# Patient Record
Sex: FEMALE | Race: BLACK OR AFRICAN AMERICAN | Employment: UNEMPLOYED | URBAN - METROPOLITAN AREA
[De-identification: names, ages, dates, MRNs, and addresses within clinical notes are randomized per-mention and may not be internally consistent; named-entity substitution may affect disease eponyms.]

---

## 2017-12-18 ENCOUNTER — APPOINTMENT (OUTPATIENT)
Dept: GENERAL RADIOLOGY | Age: 37
DRG: 862 | End: 2017-12-18
Attending: EMERGENCY MEDICINE
Payer: OTHER GOVERNMENT

## 2017-12-18 ENCOUNTER — APPOINTMENT (OUTPATIENT)
Dept: CT IMAGING | Age: 37
DRG: 862 | End: 2017-12-18
Attending: EMERGENCY MEDICINE
Payer: OTHER GOVERNMENT

## 2017-12-18 ENCOUNTER — HOSPITAL ENCOUNTER (INPATIENT)
Age: 37
LOS: 2 days | Discharge: HOME | DRG: 862 | End: 2017-12-20
Attending: EMERGENCY MEDICINE | Admitting: INTERNAL MEDICINE
Payer: OTHER GOVERNMENT

## 2017-12-18 DIAGNOSIS — I26.99 OTHER ACUTE PULMONARY EMBOLISM WITHOUT ACUTE COR PULMONALE (HCC): Primary | ICD-10-CM

## 2017-12-18 PROBLEM — R65.10 SIRS (SYSTEMIC INFLAMMATORY RESPONSE SYNDROME) (HCC): Status: ACTIVE | Noted: 2017-12-18

## 2017-12-18 PROBLEM — L03.90 CELLULITIS: Status: ACTIVE | Noted: 2017-12-18

## 2017-12-18 PROBLEM — E66.9 OBESITY: Status: ACTIVE | Noted: 2017-12-18

## 2017-12-18 LAB
ALBUMIN SERPL-MCNC: 3 G/DL (ref 3.5–5)
ALBUMIN/GLOB SERPL: 0.6 {RATIO} (ref 1.1–2.2)
ALP SERPL-CCNC: 70 U/L (ref 45–117)
ALT SERPL-CCNC: 63 U/L (ref 12–78)
ANION GAP BLD CALC-SCNC: 18 MMOL/L (ref 5–15)
ANION GAP SERPL CALC-SCNC: 11 MMOL/L (ref 5–15)
APPEARANCE UR: CLEAR
AST SERPL-CCNC: 33 U/L (ref 15–37)
BACTERIA URNS QL MICRO: ABNORMAL /HPF
BASOPHILS # BLD: 0 K/UL (ref 0–0.1)
BASOPHILS NFR BLD: 0 % (ref 0–1)
BILIRUB SERPL-MCNC: 0.6 MG/DL (ref 0.2–1)
BILIRUB UR QL: NEGATIVE
BUN BLD-MCNC: 8 MG/DL (ref 9–20)
BUN SERPL-MCNC: 8 MG/DL (ref 6–20)
BUN/CREAT SERPL: 8 (ref 12–20)
CA-I BLD-MCNC: 1.11 MMOL/L (ref 1.12–1.32)
CALCIUM SERPL-MCNC: 8.6 MG/DL (ref 8.5–10.1)
CHLORIDE BLD-SCNC: 101 MMOL/L (ref 98–107)
CHLORIDE SERPL-SCNC: 101 MMOL/L (ref 97–108)
CO2 BLD-SCNC: 24 MMOL/L (ref 21–32)
CO2 SERPL-SCNC: 26 MMOL/L (ref 21–32)
COLOR UR: ABNORMAL
CREAT BLD-MCNC: 1 MG/DL (ref 0.6–1.3)
CREAT SERPL-MCNC: 1.02 MG/DL (ref 0.55–1.02)
EOSINOPHIL # BLD: 0.2 K/UL (ref 0–0.4)
EOSINOPHIL NFR BLD: 2 % (ref 0–7)
EPITH CASTS URNS QL MICRO: ABNORMAL /LPF
ERYTHROCYTE [DISTWIDTH] IN BLOOD BY AUTOMATED COUNT: 12.6 % (ref 11.5–14.5)
GLOBULIN SER CALC-MCNC: 4.7 G/DL (ref 2–4)
GLUCOSE BLD-MCNC: 102 MG/DL (ref 65–100)
GLUCOSE SERPL-MCNC: 100 MG/DL (ref 65–100)
GLUCOSE UR STRIP.AUTO-MCNC: NEGATIVE MG/DL
HCT VFR BLD AUTO: 38.8 % (ref 35–47)
HCT VFR BLD CALC: 38 % (ref 35–47)
HGB BLD-MCNC: 12.9 GM/DL (ref 11.5–16)
HGB BLD-MCNC: 13.2 G/DL (ref 11.5–16)
HGB UR QL STRIP: NEGATIVE
KETONES UR QL STRIP.AUTO: NEGATIVE MG/DL
LACTATE SERPL-SCNC: 1.3 MMOL/L (ref 0.4–2)
LEUKOCYTE ESTERASE UR QL STRIP.AUTO: NEGATIVE
LYMPHOCYTES # BLD: 2.4 K/UL (ref 0.8–3.5)
LYMPHOCYTES NFR BLD: 17 % (ref 12–49)
MCH RBC QN AUTO: 29.7 PG (ref 26–34)
MCHC RBC AUTO-ENTMCNC: 34 G/DL (ref 30–36.5)
MCV RBC AUTO: 87.2 FL (ref 80–99)
MONOCYTES # BLD: 1.2 K/UL (ref 0–1)
MONOCYTES NFR BLD: 8 % (ref 5–13)
NEUTS SEG # BLD: 10.2 K/UL (ref 1.8–8)
NEUTS SEG NFR BLD: 73 % (ref 32–75)
NITRITE UR QL STRIP.AUTO: NEGATIVE
PH UR STRIP: 7 [PH] (ref 5–8)
PLATELET # BLD AUTO: 227 K/UL (ref 150–400)
POTASSIUM BLD-SCNC: 3.9 MMOL/L (ref 3.5–5.1)
POTASSIUM SERPL-SCNC: 3.9 MMOL/L (ref 3.5–5.1)
PROT SERPL-MCNC: 7.7 G/DL (ref 6.4–8.2)
PROT UR STRIP-MCNC: NEGATIVE MG/DL
RBC # BLD AUTO: 4.45 M/UL (ref 3.8–5.2)
RBC #/AREA URNS HPF: ABNORMAL /HPF (ref 0–5)
SERVICE CMNT-IMP: ABNORMAL
SODIUM BLD-SCNC: 138 MMOL/L (ref 136–145)
SODIUM SERPL-SCNC: 138 MMOL/L (ref 136–145)
SP GR UR REFRACTOMETRY: <1.005 (ref 1–1.03)
UA: UC IF INDICATED,UAUC: ABNORMAL
UROBILINOGEN UR QL STRIP.AUTO: 1 EU/DL (ref 0.2–1)
WBC # BLD AUTO: 14 K/UL (ref 3.6–11)
WBC URNS QL MICRO: ABNORMAL /HPF (ref 0–4)

## 2017-12-18 PROCEDURE — 80053 COMPREHEN METABOLIC PANEL: CPT | Performed by: EMERGENCY MEDICINE

## 2017-12-18 PROCEDURE — 71010 XR CHEST PORT: CPT

## 2017-12-18 PROCEDURE — 81001 URINALYSIS AUTO W/SCOPE: CPT | Performed by: EMERGENCY MEDICINE

## 2017-12-18 PROCEDURE — 83605 ASSAY OF LACTIC ACID: CPT | Performed by: EMERGENCY MEDICINE

## 2017-12-18 PROCEDURE — 36415 COLL VENOUS BLD VENIPUNCTURE: CPT | Performed by: EMERGENCY MEDICINE

## 2017-12-18 PROCEDURE — 74011250636 HC RX REV CODE- 250/636: Performed by: INTERNAL MEDICINE

## 2017-12-18 PROCEDURE — 74011250636 HC RX REV CODE- 250/636: Performed by: EMERGENCY MEDICINE

## 2017-12-18 PROCEDURE — 85025 COMPLETE CBC W/AUTO DIFF WBC: CPT | Performed by: EMERGENCY MEDICINE

## 2017-12-18 PROCEDURE — 65660000000 HC RM CCU STEPDOWN

## 2017-12-18 PROCEDURE — 96374 THER/PROPH/DIAG INJ IV PUSH: CPT

## 2017-12-18 PROCEDURE — 80047 BASIC METABLC PNL IONIZED CA: CPT

## 2017-12-18 PROCEDURE — 93005 ELECTROCARDIOGRAM TRACING: CPT

## 2017-12-18 PROCEDURE — 71275 CT ANGIOGRAPHY CHEST: CPT

## 2017-12-18 PROCEDURE — 99285 EMERGENCY DEPT VISIT HI MDM: CPT

## 2017-12-18 PROCEDURE — 87040 BLOOD CULTURE FOR BACTERIA: CPT | Performed by: EMERGENCY MEDICINE

## 2017-12-18 PROCEDURE — 87086 URINE CULTURE/COLONY COUNT: CPT | Performed by: EMERGENCY MEDICINE

## 2017-12-18 PROCEDURE — 96361 HYDRATE IV INFUSION ADD-ON: CPT

## 2017-12-18 PROCEDURE — 86738 MYCOPLASMA ANTIBODY: CPT | Performed by: INTERNAL MEDICINE

## 2017-12-18 RX ORDER — SODIUM CHLORIDE 0.9 % (FLUSH) 0.9 %
5-10 SYRINGE (ML) INJECTION AS NEEDED
Status: DISCONTINUED | OUTPATIENT
Start: 2017-12-18 | End: 2017-12-20 | Stop reason: HOSPADM

## 2017-12-18 RX ORDER — KETOROLAC TROMETHAMINE 30 MG/ML
30 INJECTION, SOLUTION INTRAMUSCULAR; INTRAVENOUS
Status: COMPLETED | OUTPATIENT
Start: 2017-12-18 | End: 2017-12-18

## 2017-12-18 RX ORDER — ENOXAPARIN SODIUM 100 MG/ML
1 INJECTION SUBCUTANEOUS EVERY 12 HOURS
Status: DISCONTINUED | OUTPATIENT
Start: 2017-12-19 | End: 2017-12-18

## 2017-12-18 RX ORDER — VANCOMYCIN/0.9 % SOD CHLORIDE 1.5G/250ML
1500 PLASTIC BAG, INJECTION (ML) INTRAVENOUS EVERY 12 HOURS
Status: DISCONTINUED | OUTPATIENT
Start: 2017-12-19 | End: 2017-12-20

## 2017-12-18 RX ORDER — ENOXAPARIN SODIUM 100 MG/ML
40 INJECTION SUBCUTANEOUS EVERY 24 HOURS
Status: DISCONTINUED | OUTPATIENT
Start: 2017-12-19 | End: 2017-12-18

## 2017-12-18 RX ORDER — ENOXAPARIN SODIUM 100 MG/ML
1 INJECTION SUBCUTANEOUS
Status: COMPLETED | OUTPATIENT
Start: 2017-12-18 | End: 2017-12-18

## 2017-12-18 RX ORDER — SODIUM CHLORIDE 0.9 % (FLUSH) 0.9 %
5-10 SYRINGE (ML) INJECTION EVERY 8 HOURS
Status: DISCONTINUED | OUTPATIENT
Start: 2017-12-18 | End: 2017-12-20 | Stop reason: HOSPADM

## 2017-12-18 RX ORDER — SODIUM CHLORIDE 9 MG/ML
30 INJECTION, SOLUTION INTRAVENOUS ONCE
Status: COMPLETED | OUTPATIENT
Start: 2017-12-18 | End: 2017-12-19

## 2017-12-18 RX ORDER — OXYCODONE AND ACETAMINOPHEN 5; 325 MG/1; MG/1
1 TABLET ORAL
Status: DISCONTINUED | OUTPATIENT
Start: 2017-12-18 | End: 2017-12-20

## 2017-12-18 RX ORDER — ACETAMINOPHEN 325 MG/1
650 TABLET ORAL
Status: DISCONTINUED | OUTPATIENT
Start: 2017-12-18 | End: 2017-12-20 | Stop reason: HOSPADM

## 2017-12-18 RX ORDER — LEVOFLOXACIN 5 MG/ML
750 INJECTION, SOLUTION INTRAVENOUS EVERY 24 HOURS
Status: DISCONTINUED | OUTPATIENT
Start: 2017-12-19 | End: 2017-12-20

## 2017-12-18 RX ORDER — ENOXAPARIN SODIUM 100 MG/ML
1 INJECTION SUBCUTANEOUS EVERY 12 HOURS
Status: DISCONTINUED | OUTPATIENT
Start: 2017-12-19 | End: 2017-12-20

## 2017-12-18 RX ORDER — OXYCODONE AND ACETAMINOPHEN 5; 325 MG/1; MG/1
1 TABLET ORAL
COMMUNITY
End: 2017-12-20

## 2017-12-18 RX ADMIN — SODIUM CHLORIDE 2913 ML: 900 INJECTION, SOLUTION INTRAVENOUS at 21:00

## 2017-12-18 RX ADMIN — VANCOMYCIN HYDROCHLORIDE 2500 MG: 10 INJECTION, POWDER, LYOPHILIZED, FOR SOLUTION INTRAVENOUS at 23:03

## 2017-12-18 RX ADMIN — KETOROLAC TROMETHAMINE 30 MG: 30 INJECTION, SOLUTION INTRAMUSCULAR at 21:33

## 2017-12-18 RX ADMIN — ENOXAPARIN SODIUM 100 MG: 100 INJECTION SUBCUTANEOUS at 22:14

## 2017-12-18 NOTE — IP AVS SNAPSHOT
Summary of Care Report The Summary of Care report has been created to help improve care coordination. Users with access to [employer] or [address] (Web-based application) may access additional patient information including the Discharge Summary. If you are not currently a [address] user and need more information, please call the number listed below in the [place] [address] section and ask to be connected with Medical Records. Facility Information Name Address Phone [address] [place] [address] [phone] Patient Information Patient Name Sex  Gilma Schmitz ([id number]) Female 1980 Discharge Information Admitting Provider Service Area Unit Rosy Salazar MD / Debbie [facility] [address] [facility]  [phone] Discharge Provider Discharge Date/Time Discharge Disposition Destination , DO / [phone] 17 1330 A[facility] (none) Patient Language Language ENGLISH [13] Hospital Problems as of 2017  Reviewed: 2017 10:51 PM by Rosy Salazar MD  
  
  
  
 Class Noted - Resolved Last Modified POA Active Problems * (Principal)Pulmonary emboli ([facility]  2017 - Present 2017 by Rosy Salazar MD Unknown Entered by Rosy Salazar MD  
  SIRS (systemic inflammatory response syndrome) ([facility]  2017 - Present 2017 by Rosy Salazar MD Unknown Entered by Rosy Salazar MD  
  Cellulitis  2017 - Present 2017 by Rosy Salazar MD Unknown Entered by Rosy Salazar MD  
  Obesity  2017 - Present 2017 by Rosy Salazar MD Unknown Entered by Rosy Salazar MD  
  
Non-Hospital Problems as of 2017  Reviewed: 2017 10:51 PM by Rosy Salazar MD  
 None You are allergic to the following No active allergies Current Discharge Medication List  
  
START taking these medications Dose & Instructions Dispensing Information Comments  
 apixaban 5 mg tablet Commonly known as:  Ladarius Ramirez Take 2 tabs PO BID x 7 days then 1 tab PO BID thereafter Quantity:  74 Tab Refills:  0  
   
 docusate sodium 100 mg capsule Commonly known as:  Melvenia Clipper Dose:  100 mg Take 1 Cap by mouth daily for 90 days. Quantity:  30 Cap Refills:  2  
   
 doxycycline 100 mg tablet Commonly known as:  VIBRA-TABS Dose:  100 mg Take 1 Tab by mouth every twelve (12) hours. Quantity:  10 Tab Refills:  0  
   
 levoFLOXacin 750 mg tablet Commonly known as:  Kemar Specking Dose:  750 mg Take 1 Tab by mouth every twenty-four (24) hours. Quantity:  5 Tab Refills:  0  
   
 neomycin-bacitracnZn-polymyxnB 3.5-400-5,000 mg-unit-unit Oipk ointment Commonly known as:  NEOSPORIN Dose:  1 Packet Apply 1 Packet to affected area daily. Quantity:  30 Packet Refills:  0  
   
 oxyCODONE-acetaminophen 7.5-325 mg per tablet Commonly known as:  PERCOCET 7.5 Replaces:  PERCOCET 5-325 mg per tablet Dose:  1 Tab Take 1 Tab by mouth every six (6) hours as needed. Max Daily Amount: 4 Tabs. Quantity:  12 Tab Refills:  0  
   
 promethazine 25 mg tablet Commonly known as:  PHENERGAN Dose:  25 mg Take 1 Tab by mouth every six (6) hours as needed for Nausea. Quantity:  20 Tab Refills:  0 STOP taking these medications Comments PERCOCET 5-325 mg per tablet Generic drug:  oxyCODONE-acetaminophen Replaced by:  oxyCODONE-acetaminophen 7.5-325 mg per tablet Follow-up Information Follow up With Details Comments Contact Info Berlin Pa, MD   Patient can only remember the practice name and not the physician [facility] [address] [facility] [address] [phone] Discharge Instructions None Chart Review Routing History No Routing History on File

## 2017-12-18 NOTE — IP AVS SNAPSHOT
Brianne Ann 
 
 
 [facility] [address] [facility] 
[phone] Patient: César Mays MRN: [id number] HYF:9/0/0585 About your hospitalization You were admitted on:  December 18, 2017 You last received care in the:  [facility] 3 PROG CARE TELE 2 You were discharged on:  December 20, 2017 Why you were hospitalized Your primary diagnosis was:  Pulmonary Emboli (H[facility]) Your diagnoses also included:  Sirs (Systemic Inflammatory Response Syndrome) ([facility]), Cellulitis, Obesity Things You Need To Do (next 8 weeks) Follow up with Berlin Pa MD  
  
Where:  Patient can only remember the practice name and not the physician Follow up with [facility] Phone:  [phone] Where:  Jamil Salas [facility] [place] [address] [facility] [address] Discharge Orders None A check david indicates which time of day the medication should be taken. My Medications STOP taking these medications PERCOCET 5-325 mg per tablet Generic drug:  oxyCODONE-acetaminophen Replaced by:  oxyCODONE-acetaminophen 7.5-325 mg per tablet TAKE these medications as instructed Instructions Each Dose to Equal  
 Morning Noon Evening Bedtime  
 apixaban 5 mg tablet Commonly known as:  Joel Limon Your last dose was: Your next dose is: Take 2 tabs PO BID x 7 days then 1 tab PO BID thereafter  
     
   
   
   
  
 docusate sodium 100 mg capsule Commonly known as:  Zamzam Donate Your last dose was: Your next dose is: Take 1 Cap by mouth daily for 90 days. 100 mg  
    
   
   
   
  
 doxycycline 100 mg tablet Commonly known as:  VIBRA-TABS Your last dose was: Your next dose is: Take 1 Tab by mouth every twelve (12) hours. 100 mg  
    
   
   
   
  
 levoFLOXacin 750 mg tablet Commonly known as:  Marcell Meek Your last dose was: Your next dose is: Take 1 Tab by mouth every twenty-four (24) hours. 750 mg  
    
   
   
   
  
 neomycin-bacitracnZn-polymyxnB 3.5-400-5,000 mg-unit-unit Oipk ointment Commonly known as:  NEOSPORIN Your last dose was: Your next dose is:    
   
   
 Apply 1 Packet to affected area daily. 1 Packet  
    
   
   
   
  
 oxyCODONE-acetaminophen 7.5-325 mg per tablet Commonly known as:  PERCOCET 7.5 Your last dose was: Your next dose is: Take 1 Tab by mouth every six (6) hours as needed. Max Daily Amount: 4 Tabs. 1 Tab  
    
   
   
   
  
 promethazine 25 mg tablet Commonly known as:  PHENERGAN Your last dose was: Your next dose is: Take 1 Tab by mouth every six (6) hours as needed for Nausea. 25 mg Where to Get Your Medications Information on where to get these meds will be given to you by the nurse or doctor. ! Ask your nurse or doctor about these medications  
  apixaban 5 mg tablet  
 docusate sodium 100 mg capsule  
 doxycycline 100 mg tablet  
 levoFLOXacin 750 mg tablet  
 neomycin-bacitracnZn-polymyxnB 3.5-400-5,000 mg-unit-unit Oipk ointment  
 oxyCODONE-acetaminophen 7.5-325 mg per tablet  
 promethazine 25 mg tablet Discharge Instructions None [facility] Announcement We are excited to announce that we are making your provider's discharge notes available to you in [employer]. You will see these notes when they are completed and signed by the physician that discharged you from your recent hospital stay. If you have any questions or concerns about any information you see in [employer], please call the Health Information Department where you were seen or reach out to your Primary Care Provider for more information about your plan of care. Introducing [facility]!    
 Nolan Gomez introduces [employer] patient portal. Now you can access parts of your medical record, email your doctor's office, and request medication refills online. 1. In your internet browser, go to [email or web address] 2. Click on the First Time User? Click Here link in the Sign In box. You will see the New Member Sign Up page. 3. Enter your DocsInk Access Code exactly as it appears below. You will not need to use this code after youve completed the sign-up process. If you do not sign up before the expiration date, you must request a new code. · DocsInk Access Code: [email or web address] Expires: 3/20/2018 10:13 AM 
 
4. Enter the last four digits of your Social Security Number (xxxx) and Date of Birth (mm/dd/yyyy) as indicated and click Submit. You will be taken to the next sign-up page. 5. Create a DocsInk ID. This will be your DocsInk login ID and cannot be changed, so think of one that is secure and easy to remember. 6. Create a [employer]Ink password. You can change your password at any time. 7. Enter your Password Reset Question and Answer. This can be used at a later time if you forget your password. 8. Enter your e-mail address. You will receive e-mail notification when new information is available in [address] 9. Click Sign Up. You can now view and download portions of your medical record. 10. Click the Download Summary menu link to download a portable copy of your medical information. If you have questions, please visit the Frequently Asked Questions section of the [employer]k website. Remember, DocsInk is NOT to be used for urgent needs. For medical emergencies, dial 911. Now available from your iPhone and Android! Unresulted Labs-Please follow up with your PCP about these lab tests Order Current Status CULTURE, BLOOD, PAIRED Preliminary result Providers Seen During Your Hospitalization Provider Specialty Primary office phone Kenneth Canales MD Emergency Medicine [phone] Roberto Lal MD Internal Medicine [phone] [address] [place] [address] Internal Medicine [phone] Your Primary Care Physician (PCP) Primary Care Physician Office Phone Office Fax OTHER, PHYS ** None ** ** None ** You are allergic to the following No active allergies Recent Documentation Height Weight Breastfeeding? BMI Smoking Status 1.689 m 95 kg No 33.3 kg/m2 Never Smoker Emergency Contacts Name Discharge Info Relation Home Work [facility] DIS[facility]ARE[facility]IVER [3] Spouse [3]   [phone] Patient Belongings The following personal items are in your possession at time of discharge: 
  Dental Appliances: None  Visual Aid: None      Home Medications: None   Jewelry: Earrings, Ring  Clothing: Footwear, Pajamas, Socks, Undergarments, With patient    Other Valuables: [employer], Eyeglasses Discharge Instructions Attachments/References MEFS - APIXABAN (ELIQUIS) - (BY MOUTH) (ENGLISH) MEFS - LEVOFLOXACIN (LEVAQUIN, LEVAQUIN LEVA-DANIELLE) - (BY MOUTH) (ENGLISH) MEFS - DOXYCYCLINE (ACTICLATE, ADOXA, AVIDOXY, MONODOX) - (BY MOUTH) (ENGLISH) MEFS - LAXATIVE, STOOL SOFTENERS (DOCULAX, COLACE, COLACE CLEAR, DSS) - (BY MOUTH) (ENGLISH) MEFS - OXYCODONE/ACETAMINOPHEN (PERCOCET, ROXICET) - (BY MOUTH) (ENGLISH) Patient Handouts Apixaban (Eliquis) - (By mouth) Why this medicine is used:  
Treats and prevents blood clots. Contact a nurse or doctor right away if you have: 
· Sudden or severe headache · Back pain, numbness, tingling, weakness in your legs or feet · Loss of bladder or bowel control · Bloody vomit or vomit that looks like coffee grounds; bloody or black, tarry stools · Bleeding that does not stop or bruises that do not heal  
 
Common side effects: · Minor bleeding or bruising © 2017 [address] [employer] Information is for End User's use only and may not be sold, redistributed or otherwise used for commercial purposes. Levofloxacin (Levaquin, Levaquin Leva-mary) - (By mouth) Why this medicine is used:  
Treats infections. Contact a nurse or doctor right away if you have: · Blistering, peeling, red skin rash · Fast, slow, or uneven heartbeat; lightheadedness or fainting · Dark urine or pale stools, loss of appetite, stomach pain, yellow skin or eyes · Severe or bloody diarrhea · Pain, stiffness, swelling, or bruises around your ankle, leg, shoulder, or other joint Common side effects: · Mild nausea, vomiting, diarrhea · Mild headache © 2017 [address]  Information is for End User's use only and may not be sold, redistributed or otherwise used for commercial purposes. Doxycycline (Acticlate, Adoxa, Avidoxy, Monodox) - (By mouth) Why this medicine is used:  
Treats and prevents infections, treats rosacea, or severe acne. Contact a nurse or doctor right away if you have: · Blistering, peeling, red skin rash · Severe or bloody diarrhea · Severe headache, dizziness, vision changes · Burning, pain, or irritation in your upper stomach or throat · Sudden and severe stomach pain, nausea, vomiting, lightheadedness Common side effects: · Discoloration of teeth in children · Sores or white patches on your lips, mouth, or throat © 2017 [address] Information is for End User's use only and may not be sold, redistributed or otherwise used for commercial purposes. Laxative, Stool Softeners (Doculax, Colace, Colace Clear, DSS) - (By mouth) Why this medicine is used:  
Treats constipation by helping you have a bowel movement. Contact a nurse or doctor right away if you have: · Dark urine or pale stools · Vomiting, loss of appetite, stomach pain · Yellow skin or eyes Common side effects: 
· Nausea, diarrhea, stomach cramps, bitter taste in mouth © 2017 [address]  Information is for End User's use only and may not be sold, redistributed or otherwise used for commercial purposes. Oxycodone/Acetaminophen (Percocet, Roxicet) - (By mouth) Why this medicine is used:  
Treats pain. This medicine contains a narcotic pain reliever. Contact a nurse or doctor right away if you have: 
· Extreme weakness, shallow breathing, slow heartbeat · Sweating or cold, clammy skin · Skin blisters, rash, or peeling Common side effects: 
· Constipation · Nausea, vomiting · Tiredness © 2017 [address]  Information is for End User's use only and may not be sold, redistributed or otherwise used for commercial purposes. Please provide this summary of care documentation to your next provider. Signatures-by signing, you are acknowledging that this After Visit Summary has been reviewed with you and you have received a copy. Patient Signature:  ____________________________________________________________ Date:  ____________________________________________________________  
  
[facility] Provider Signature:  ____________________________________________________________ Date:  ____________________________________________________________

## 2017-12-18 NOTE — IP AVS SNAPSHOT
[address] 
[phone] Patient: Ramiro Umaña MRN: [id number] OOR:[birth year] My Medications STOP taking these medications PERCOCET 5-325 mg per tablet Generic drug:  oxyCODONE-acetaminophen Replaced by:  oxyCODONE-acetaminophen 7.5-325 mg per tablet TAKE these medications as instructed Instructions Each Dose to Equal  
 Morning Noon Evening Bedtime  
 apixaban 5 mg tablet Commonly known as:  Princella Fernandes Your last dose was: Your next dose is: Take 2 tabs PO BID x 7 days then 1 tab PO BID thereafter  
     
   
   
   
  
 docusate sodium 100 mg capsule Commonly known as:  Pelzer Rudi Your last dose was: Your next dose is: Take 1 Cap by mouth daily for 90 days. 100 mg  
    
   
   
   
  
 doxycycline 100 mg tablet Commonly known as:  VIBRA-TABS Your last dose was: Your next dose is: Take 1 Tab by mouth every twelve (12) hours. 100 mg  
    
   
   
   
  
 levoFLOXacin 750 mg tablet Commonly known as:  Josse Back Your last dose was: Your next dose is: Take 1 Tab by mouth every twenty-four (24) hours. 750 mg  
    
   
   
   
  
 neomycin-bacitracnZn-polymyxnB 3.5-400-5,000 mg-unit-unit Oipk ointment Commonly known as:  NEOSPORIN Your last dose was: Your next dose is:    
   
   
 Apply 1 Packet to affected area daily. 1 Packet  
    
   
   
   
  
 oxyCODONE-acetaminophen 7.5-325 mg per tablet Commonly known as:  PERCOCET 7.5 Your last dose was: Your next dose is: Take 1 Tab by mouth every six (6) hours as needed. Max Daily Amount: 4 Tabs. 1 Tab  
    
   
   
   
  
 promethazine 25 mg tablet Commonly known as:  PHENERGAN Your last dose was: Your next dose is: Take 1 Tab by mouth every six (6) hours as needed for Nausea. 25 mg Where to Get Your Medications Information on where to get these meds will be given to you by the nurse or doctor. ! Ask your nurse or doctor about these medications  
  apixaban 5 mg tablet  
 docusate sodium 100 mg capsule  
 doxycycline 100 mg tablet  
 levoFLOXacin 750 mg tablet  
 neomycin-bacitracnZn-polymyxnB 3.5-400-5,000 mg-unit-unit Oipk ointment  
 oxyCODONE-acetaminophen 7.5-325 mg per tablet  
 promethazine 25 mg tablet

## 2017-12-19 ENCOUNTER — APPOINTMENT (OUTPATIENT)
Dept: CT IMAGING | Age: 37
DRG: 862 | End: 2017-12-19
Attending: INTERNAL MEDICINE
Payer: OTHER GOVERNMENT

## 2017-12-19 LAB
ANION GAP SERPL CALC-SCNC: 11 MMOL/L (ref 5–15)
APPEARANCE UR: ABNORMAL
ATRIAL RATE: 111 BPM
ATRIAL RATE: 96 BPM
BILIRUB UR QL: NEGATIVE
BUN SERPL-MCNC: 7 MG/DL (ref 6–20)
BUN/CREAT SERPL: 7 (ref 12–20)
CALCIUM SERPL-MCNC: 7.9 MG/DL (ref 8.5–10.1)
CALCULATED P AXIS, ECG09: 23 DEGREES
CALCULATED P AXIS, ECG09: 35 DEGREES
CALCULATED R AXIS, ECG10: 38 DEGREES
CALCULATED R AXIS, ECG10: 63 DEGREES
CALCULATED T AXIS, ECG11: 26 DEGREES
CALCULATED T AXIS, ECG11: 26 DEGREES
CHLORIDE SERPL-SCNC: 106 MMOL/L (ref 97–108)
CO2 SERPL-SCNC: 25 MMOL/L (ref 21–32)
COLOR UR: ABNORMAL
CREAT SERPL-MCNC: 0.99 MG/DL (ref 0.55–1.02)
DIAGNOSIS, 93000: NORMAL
DIAGNOSIS, 93000: NORMAL
ERYTHROCYTE [DISTWIDTH] IN BLOOD BY AUTOMATED COUNT: 12.6 % (ref 11.5–14.5)
GLUCOSE SERPL-MCNC: 111 MG/DL (ref 65–100)
GLUCOSE UR STRIP.AUTO-MCNC: NEGATIVE MG/DL
HCT VFR BLD AUTO: 33.5 % (ref 35–47)
HGB BLD-MCNC: 11 G/DL (ref 11.5–16)
HGB UR QL STRIP: NEGATIVE
KETONES UR QL STRIP.AUTO: NEGATIVE MG/DL
LACTATE SERPL-SCNC: 1.2 MMOL/L (ref 0.4–2)
LEUKOCYTE ESTERASE UR QL STRIP.AUTO: NEGATIVE
MCH RBC QN AUTO: 28.6 PG (ref 26–34)
MCHC RBC AUTO-ENTMCNC: 32.8 G/DL (ref 30–36.5)
MCV RBC AUTO: 87 FL (ref 80–99)
NITRITE UR QL STRIP.AUTO: NEGATIVE
P-R INTERVAL, ECG05: 142 MS
P-R INTERVAL, ECG05: 186 MS
PH UR STRIP: 5.5 [PH] (ref 5–8)
PLATELET # BLD AUTO: 206 K/UL (ref 150–400)
POTASSIUM SERPL-SCNC: 4.4 MMOL/L (ref 3.5–5.1)
PROT UR STRIP-MCNC: NEGATIVE MG/DL
Q-T INTERVAL, ECG07: 312 MS
Q-T INTERVAL, ECG07: 340 MS
QRS DURATION, ECG06: 76 MS
QRS DURATION, ECG06: 82 MS
QTC CALCULATION (BEZET), ECG08: 424 MS
QTC CALCULATION (BEZET), ECG08: 429 MS
RBC # BLD AUTO: 3.85 M/UL (ref 3.8–5.2)
SODIUM SERPL-SCNC: 142 MMOL/L (ref 136–145)
SP GR UR REFRACTOMETRY: 1.02 (ref 1–1.03)
UROBILINOGEN UR QL STRIP.AUTO: 1 EU/DL (ref 0.2–1)
VENTRICULAR RATE, ECG03: 111 BPM
VENTRICULAR RATE, ECG03: 96 BPM
WBC # BLD AUTO: 12.5 K/UL (ref 3.6–11)

## 2017-12-19 PROCEDURE — 74011636320 HC RX REV CODE- 636/320: Performed by: RADIOLOGY

## 2017-12-19 PROCEDURE — 85027 COMPLETE CBC AUTOMATED: CPT | Performed by: INTERNAL MEDICINE

## 2017-12-19 PROCEDURE — 81003 URINALYSIS AUTO W/O SCOPE: CPT | Performed by: EMERGENCY MEDICINE

## 2017-12-19 PROCEDURE — 93005 ELECTROCARDIOGRAM TRACING: CPT

## 2017-12-19 PROCEDURE — 74011250637 HC RX REV CODE- 250/637: Performed by: INTERNAL MEDICINE

## 2017-12-19 PROCEDURE — 74011250636 HC RX REV CODE- 250/636: Performed by: INTERNAL MEDICINE

## 2017-12-19 PROCEDURE — 65660000000 HC RM CCU STEPDOWN

## 2017-12-19 PROCEDURE — 87449 NOS EACH ORGANISM AG IA: CPT | Performed by: INTERNAL MEDICINE

## 2017-12-19 PROCEDURE — 74177 CT ABD & PELVIS W/CONTRAST: CPT

## 2017-12-19 PROCEDURE — 83605 ASSAY OF LACTIC ACID: CPT | Performed by: EMERGENCY MEDICINE

## 2017-12-19 PROCEDURE — 80048 BASIC METABOLIC PNL TOTAL CA: CPT | Performed by: INTERNAL MEDICINE

## 2017-12-19 PROCEDURE — 36415 COLL VENOUS BLD VENIPUNCTURE: CPT | Performed by: EMERGENCY MEDICINE

## 2017-12-19 PROCEDURE — 74011636320 HC RX REV CODE- 636/320: Performed by: INTERNAL MEDICINE

## 2017-12-19 RX ORDER — KETOROLAC TROMETHAMINE 30 MG/ML
30 INJECTION, SOLUTION INTRAMUSCULAR; INTRAVENOUS ONCE
Status: COMPLETED | OUTPATIENT
Start: 2017-12-19 | End: 2017-12-19

## 2017-12-19 RX ORDER — MORPHINE SULFATE 4 MG/ML
4 INJECTION INTRAVENOUS
Status: DISCONTINUED | OUTPATIENT
Start: 2017-12-19 | End: 2017-12-20

## 2017-12-19 RX ORDER — MORPHINE SULFATE 2 MG/ML
2 INJECTION, SOLUTION INTRAMUSCULAR; INTRAVENOUS
Status: DISCONTINUED | OUTPATIENT
Start: 2017-12-19 | End: 2017-12-19

## 2017-12-19 RX ORDER — POLYETHYLENE GLYCOL 3350 17 G/17G
17 POWDER, FOR SOLUTION ORAL DAILY
Status: DISCONTINUED | OUTPATIENT
Start: 2017-12-19 | End: 2017-12-20 | Stop reason: HOSPADM

## 2017-12-19 RX ADMIN — KETOROLAC TROMETHAMINE 30 MG: 30 INJECTION, SOLUTION INTRAMUSCULAR at 18:47

## 2017-12-19 RX ADMIN — ENOXAPARIN SODIUM 100 MG: 100 INJECTION SUBCUTANEOUS at 21:49

## 2017-12-19 RX ADMIN — Medication 10 ML: at 01:13

## 2017-12-19 RX ADMIN — OXYCODONE HYDROCHLORIDE AND ACETAMINOPHEN 1 TABLET: 5; 325 TABLET ORAL at 14:12

## 2017-12-19 RX ADMIN — VANCOMYCIN HYDROCHLORIDE 1500 MG: 10 INJECTION, POWDER, LYOPHILIZED, FOR SOLUTION INTRAVENOUS at 10:02

## 2017-12-19 RX ADMIN — Medication 10 ML: at 21:54

## 2017-12-19 RX ADMIN — POLYETHYLENE GLYCOL 3350 17 G: 17 POWDER, FOR SOLUTION ORAL at 12:40

## 2017-12-19 RX ADMIN — MORPHINE SULFATE 2 MG: 2 INJECTION, SOLUTION INTRAMUSCULAR; INTRAVENOUS at 15:33

## 2017-12-19 RX ADMIN — IOPAMIDOL 95 ML: 755 INJECTION, SOLUTION INTRAVENOUS at 23:32

## 2017-12-19 RX ADMIN — LEVOFLOXACIN 750 MG: 5 INJECTION, SOLUTION INTRAVENOUS at 01:42

## 2017-12-19 RX ADMIN — ENOXAPARIN SODIUM 100 MG: 100 INJECTION SUBCUTANEOUS at 09:17

## 2017-12-19 RX ADMIN — OXYCODONE HYDROCHLORIDE AND ACETAMINOPHEN 1 TABLET: 5; 325 TABLET ORAL at 09:16

## 2017-12-19 RX ADMIN — DIATRIZOATE MEGLUMINE AND DIATRIZOATE SODIUM 30 ML: 660; 100 LIQUID ORAL; RECTAL at 21:49

## 2017-12-19 RX ADMIN — MORPHINE SULFATE 2 MG: 2 INJECTION, SOLUTION INTRAMUSCULAR; INTRAVENOUS at 12:40

## 2017-12-19 RX ADMIN — VANCOMYCIN HYDROCHLORIDE 1500 MG: 10 INJECTION, POWDER, LYOPHILIZED, FOR SOLUTION INTRAVENOUS at 23:30

## 2017-12-19 RX ADMIN — OXYCODONE HYDROCHLORIDE AND ACETAMINOPHEN 1 TABLET: 5; 325 TABLET ORAL at 21:48

## 2017-12-19 NOTE — PROGRESS NOTES
Primary Nurse Azell Rinne, RN and LORAINE Zamora performed a dual skin assessment on this patient Impairment noted- see wound doc flow sheet  Nader score is 23

## 2017-12-19 NOTE — PROGRESS NOTES
[facility]  [address] [facility] [place], [address] [place] [address]  [phone]      Medical Progress Note      NAME: Debbie Cochran   :  1980  MRM:  [id number]    Date/Time: 2017  10:40 AM       Assessment and Plan:     Pulmonary embolus: noted on CTA. Having continued pleuritic chest pain. Continue lovenox until need for any intervention is excluded.     Bilateral pneumonia / pleural effusions: noted on CT chest. Continue levaquin and vanc. Pneumonia panel pending     Chest pain: pleuritic, 2/2 above. Pain medication as needed     Cellulitis abdominal wall: associated with some purulent drainage at surgical site. CT abd to r/o abscess. [facility]. If evidence of abscess, will need assistance from plastics.     Sepsis: 2/4 SIRS POA associated with cellulitis. IVF and Abx as above. Blood cultures pending     Hypotension: now resolved, currently receiving 3L IVF in ER. Related to sepsis but not severe sepsis as no specific end-organ damage     Obesity: advised weight loss          Subjective:     Chief Complaint:  Patient seen and examined. Chart reviewed. Patient having pleuritic chest pain at time of my exam, ECG done and showed NSR with no ischemic changes, BP and POX nml. Improved with percocet or repeat assessment. ROS:  (bold if positive, if negative)      Tolerating PT  Tolerating Diet        Objective:     Last 24hrs VS reviewed since prior progress note.  Most recent are:    Visit Vitals    /82 (BP 1 Location: Right arm, BP Patient Position: At rest;Supine)    Pulse 97    Temp 98.8 °F (37.1 °C)    Resp 18    Ht 5' 6.5\" (1.689 m)    Wt 93.5 kg (206 lb 2.1 oz)    SpO2 98%    Breastfeeding No    BMI 32.77 kg/m2     SpO2 Readings from Last 6 Encounters:   17 98%          Intake/Output Summary (Last 24 hours) at 17 1040  Last data filed at 17 0358   Gross per 24 hour   Intake                0 ml   Output 200 ml   Net             -200 ml        Physical Exam:    Gen:  Well-developed, well-nourished, in no acute distress  HEENT:  Pink conjunctivae, PERRL, hearing intact to voice, moist mucous membranes  Neck:  Supple, without masses, thyroid non-tender  Resp:  No accessory muscle use, clear breath sounds without wheezes rales or rhonchi  Card:  No murmurs, normal S1, S2 without thrills, bruits or peripheral edema  Abd:  Soft, non-tender, non-distended, normoactive bowel sounds are present, no palpable organomegaly and no detectable hernias  Lymph:  No cervical or inguinal adenopathy  Musc:  No cyanosis or clubbing  Skin:  No rashes or ulcers, skin turgor is good  Neuro:  Cranial nerves are grossly intact, no focal motor weakness, follows commands appropriately  Psych:  Good insight, oriented to person, place and time, alert    Telemetry reviewed:   Sinus tachycardia  __________________________________________________________________  Medications Reviewed: (see below)  Medications:     Current Facility-Administered Medications   Medication Dose Route Frequency    influenza vaccine 2017-18 (3 yrs+)(PF) (FLUZONE QUAD/FLUARIX QUAD) injection 0.5 mL  0.5 mL IntraMUSCular PRIOR TO DISCHARGE    sodium chloride (NS) flush 5-10 mL  5-10 mL IntraVENous PRN    sodium chloride (NS) flush 5-10 mL  5-10 mL IntraVENous PRN    vancomycin (VANCOCIN) 1500 mg in  ml infusion  1,500 mg IntraVENous Q12H    sodium chloride (NS) flush 5-10 mL  5-10 mL IntraVENous Q8H    sodium chloride (NS) flush 5-10 mL  5-10 mL IntraVENous PRN    levoFLOXacin (LEVAQUIN) 750 mg in D5W IVPB  750 mg IntraVENous Q24H    acetaminophen (TYLENOL) tablet 650 mg  650 mg Oral Q4H PRN    oxyCODONE-acetaminophen (PERCOCET) 5-325 mg per tablet 1 Tab  1 Tab Oral Q4H PRN    enoxaparin (LOVENOX) injection 100 mg  1 mg/kg SubCUTAneous Q12H        Lab Data Reviewed: (see below)  Lab Review:     Recent Labs      12/19/17   0101  12/18/17   2044   WBC 12.5*  14.0*   HGB  11.0*  13.2   HCT  33.5*  38.8   PLT  206  227     Recent Labs      12/19/17   0101  12/18/17 2102   NA  142  138   K  4.4  3.9   CL  106  101   CO2  25  26   GLU  111*  100   BUN  7  8   CREA  0.99  1.02   CA  7.9*  8.6   ALB   --   3.0*   TBILI   --   0.6   SGOT   --   33   ALT   --   63     Lab Results   Component Value Date/Time    Glucose (POC) 102 12/18/2017 09:03 PM     No results for input(s): PH, PCO2, PO2, HCO3, FIO2 in the last 72 hours. No results for input(s): INR in the last 72 hours. No lab exists for component: INREXT  All Micro Results     Procedure Component Value Units Date/Time    CULTURE, URINE [[id number]] Collected:  12/18/17 2225    Order Status:  Completed Updated:  12/19/17 1014    CULTURE, BLOOD, PAIRED [[id number]] Collected:  12/18/17 2045    Order Status:  Completed Specimen:  Blood Updated:  12/19/17 0807     Special Requests: NO SPECIAL REQUESTS        Culture result: NO GROWTH AFTER 9 HOURS       LEGIONELLA PNEUMOPHILA AG, URINE [[id number]] Collected:  12/19/17 0345    Order Status:  Completed Specimen:  Urine from Urine Updated:  12/19/17 0402    MYCOPLASMA AB, IGG/IGM [[id number]] Collected:  12/18/17 2102    Order Status:  Completed Specimen:  Serum Updated:  12/19/17 0043    CULTURE, RESPIRATORY/SPUTUM/BRONCH Pettis Hedges STAIN [[id number]]     Order Status:  Sent Specimen:  Sputum from Sputum     CULTURE, URINE [[id number]] Collected:  12/18/17 2100    Order Status:  Canceled Specimen:  Urine from Cath Urine           I have reviewed notes of prior 24hr.     Other pertinent lab: None    Total time spent with patient: 50 mins                  Care Plan discussed with: Patient, Care Manager, Nursing Staff and >50% of time spent in counseling and coordination of care    Discussed:  Care Plan and D/C Planning    Prophylaxis:  Lovenox    Disposition:  Home w/Family           ___________________________________________________    Attending Physician: Crystal Torres, DO

## 2017-12-19 NOTE — CDMP QUERY
1.   Please clarify if this patient is being treated/managed for:    =>Sepsis POA in the setting of abdominal wall cellulitis, PE, and pneumonia, as evidenced by SIRS criteria of tachycardia in 110's, tachypnea in 30's, and leukocytosis of 14. Being treated with bolus NS 30 ml/kg and IV Vancomycin and Levaquin  =>Other Explanation of clinical findings  =>Unable to Determine (no explanation of clinical findings)    The medical record reflects the following clinical findings, treatment, and risk factors:    39 y/o  female is s/p recent tummy tuck. Presents with SOB and chest pain. She is diagnosed with acute rosemary PE's, pneumonia, and cellulitis of abdominal wall. As well SIRS is diagnosed. On admission she has SIRS criteria of tachycardia in 110's, tachypnea in 30's, and leukocytosis of 14. She is treated with bolus NS 30 ml/kg, and IV Vancomycin and Levaquin    Please clarify and document your clinical opinion in the progress notes and discharge summary including the definitive and/or presumptive diagnosis, (suspected or probable), related to the above clinical findings. Please include clinical findings supporting your diagnosis. Thanks for your time.     Cody Sawant RN, BSN  [phone]

## 2017-12-19 NOTE — ED NOTES
Bedside and Verbal shift change report given to Shannon Coleman (oncoming nurse) by [place] (offgoing nurse). Report included the following information SBAR, ED Summary, MAR and Recent Results.

## 2017-12-19 NOTE — WOUND CARE
Wound care  Nurse consult to assess the lower pannus surgical incision - s/p tummy tuck 3 wks ago in [place]. Alert, no distress, no pain. Assessment  Lower abdominal incision intact and well approximated, small blue protruding sutures on bilateral ends of incision, kalen incision skin is dry and peeling, no redness, no active drainage noted. Small area of pink tissue noted on the right lateral incision, states area drains intermittently. Patient states she has not showered and has been using peroxide to clean the incision daily. Education given regarding skin and suture line care. Plan of care discussed with Dr Portillo Aguilar, orders obtained. Reconsult if needed.   Manisha Cabral

## 2017-12-19 NOTE — ED TRIAGE NOTES
Patient arrives with family, states at 11am she started to have shortness of breath and chest pain. Recently had gastric bypass surgery, some abdominal pain and tenderness.

## 2017-12-19 NOTE — H&P
[address]  [phone]    Admission History and Physical      NAME:  Keiko Palmer   :   1980   MRN:  [id number]     PCP:  Berlin Pa MD     Date/Time:  2017         Subjective:     CHIEF COMPLAINT: chest pain     HISTORY OF PRESENT ILLNESS:     Ms. Tk Abbott is a 40 y.o. with no medical history who presents with chest pain. She does have a surgical history of tummy tuck which was done three weeks ago in [place]. The pain started earlier today and is sharp and located on right side of chest. Worse with deep breaths. She was told to follow up with the surgeon weekly but she has not seen him since the surgery was complete. She does note drainage at the surgical site      History reviewed. No pertinent past medical history. History reviewed. No pertinent surgical history. Social History   Substance Use Topics    Smoking status: Never Smoker    Smokeless tobacco: Never Used    Alcohol use No        Family history  DM    No Known Allergies     Prior to Admission medications    Medication Sig Start Date End Date Taking? Authorizing Provider   oxyCODONE-acetaminophen (PERCOCET) 5-325 mg per tablet Take 1 Tab by mouth every four (4) hours as needed for Pain.    Yes Historical Provider         Review of Systems:    Gen:  Eyes:  ENT:  CVS:  chest painPulm:  GI:    :    MS:  Skin:  Psych:  Endo:    Hem:  Renal:    Neuro:            Objective:      VITALS:    Vital signs reviewed; most recent are:    Visit Vitals    /67    Pulse (!) 107    Temp 100 °F (37.8 °C)    Resp 29    Ht 5' 6.5\" (1.689 m)    Wt 97.1 kg (214 lb)    SpO2 96%    BMI 34.02 kg/m2     SpO2 Readings from Last 6 Encounters:   17 96%        No intake or output data in the 24 hours ending 17 1601         Exam:     Physical Exam:    Gen:  Well-developed, well-nourished, in no acute distress  HEENT:  Pink conjunctivae, PERRL, hearing intact to voice, moist mucous membranes  Neck:  Supple, without masses, thyroid non-tender  Resp:  No accessory muscle use, clear breath sounds without wheezes rales or rhonchi  Card:  No murmurs, normal S1, S2 without thrills, bruits or peripheral edema  Abd:  Soft, non-tender, non-distended, normoactive bowel sounds are present, no palpable organomegaly  Lymph:  No cervical adenopathy  Musc:  No cyanosis or clubbing  Skin:  Incision site healed but with two open areas and yellow drainage at those sites. Neuro:  Cranial nerves 3-12 are grossly intact,  strength is 5/5 bilaterally, dorsi / plantarflexion strength is 5/5 bilaterally, follows commands appropriately  Psych:  Alert with good insight. Oriented to person, place, and time       Labs:    Recent Labs      12/18/17 2044   WBC  14.0*   HGB  13.2   HCT  38.8   PLT  227     Recent Labs      12/18/17   2102   NA  138   K  3.9   CL  101   CO2  26   GLU  100   BUN  8   CREA  1.02   CA  8.6   ALB  3.0*   SGOT  33   ALT  63     No components found for: GLPOC  No results for input(s): PH, PCO2, PO2, HCO3, FIO2 in the last 72 hours. No results for input(s): INR in the last 72 hours. No lab exists for component: INREXT         Assessment/Plan:       Pulmonary embolus: noted on CTA. Continue lovenox    Bilateral pneumonia / pleural effusions: noted on CT chest. Start levaquin and vanc. Send pneumonia work up. Chest pain: pleuritic, 2/2 above. Pain medication as needed      Cellulitis abdominal wall: associated with some purulent drainage at surgical site. Needs CT abd to r/o abscess, will order for tomorrow as pt already received IV contrast. Start vanc      SIRS: 2/4 POA associated with cellulitis. Start abx as above. Blood cultures sent    Hypotension: now resolved, currently receiving 3L IVF in ER.        Obesity: advise weight loss      Surrogate decision maker:     Total time spent with patient: [address] discussed with: Patient and Family    Discussed:  Care Plan    Prophylaxis:  Lovenox    Probable Disposition:  Home w/Family           ___________________________________________________    Attending Physician: Lynnette Nguyen MD

## 2017-12-19 NOTE — PROGRESS NOTES
[facility] Dosing Services: Antimicrobial Stewardship Daily Doc    Consult for antibiotic dosing of Vancomycin by Dr. Keke Ha  Day of Therapy 1      Vancomycin therapy:  LD: 2500mg x1  MD: 1500mg q 12 hrs  Dose calculated to approximate a therapeutic trough of 15-20 mcg/mL. Plan for level / Adjustment in Therapy:  Trough prior to 4th total dose (not done yet)      Other Antimicrobial   (not dosed by pharmacist) N/a   Cultures 12/18: Blood- pending  12/18: Urine- pending   Serum Creatinine Lab Results   Component Value Date/Time    Creatinine 1.02 12/18/2017 09:02 PM    Creatinine (POC) 1.0 12/18/2017 09:03 PM         Creatinine Clearance Estimated Creatinine Clearance: 89.5 mL/min (based on Cr of 1.02). Temp Temp: 100 °F (37.8 °C)       WBC Lab Results   Component Value Date/Time    WBC 14.0 12/18/2017 08:44 PM        H/H Lab Results   Component Value Date/Time    HGB 13.2 12/18/2017 08:44 PM        Platelets    Lab Results   Component Value Date/Time    PLATELET 027 27/30/6245 08:44 PM            Thank you,  Jovana Collado, Pharm. D.

## 2017-12-19 NOTE — ED PROVIDER NOTES
HPI Comments: 40 y.o. female with past medical history significant for recent \"tummy tuck\" surgery who presents from home via private vehicle with chief complaint of pleuritic chest pain and SOB since 1130 today. Pt states she was eating egg sandwich when she suddenly experienced right sided CP and SOB. She felt \"fine\" prior to onset. Pt reports nausea, body aches, dizziness, weakness, and headache. Pt denies Hx of PE/DVT. She reports Hx of two similar episodes in past the but states she was never found to be hypotensive. Pt denies cough, congestion, vomiting, diarrhea, vomiting, leg pain, and leg swelling. There are no other acute medical concerns at this time. Social hx: never smoker, no alcohol or drug use    Note written by rubén Madera, as dictated by Octavia Magaña MD 9:15 PM         The history is provided by the patient. No  was used. No past medical history on file. No past surgical history on file. No family history on file. Social History     Social History    Marital status: N/A     Spouse name: N/A    Number of children: N/A    Years of education: N/A     Occupational History    Not on file. Social History Main Topics    Smoking status: Not on file    Smokeless tobacco: Not on file    Alcohol use Not on file    Drug use: Not on file    Sexual activity: Not on file     Other Topics Concern    Not on file     Social History Narrative         ALLERGIES: Review of patient's allergies indicates not on file. Review of Systems   Constitutional:        Positive for generalized weakness   HENT: Negative for congestion. Respiratory: Negative for cough. Gastrointestinal: Positive for nausea. Negative for diarrhea and vomiting. Genitourinary: Negative for dysuria. Musculoskeletal: Positive for myalgias. Positive for body aches. Negative for leg pain or swelling   Neurological: Positive for dizziness, weakness and headaches.    All other systems reviewed and are negative. Vitals:    12/18/17 2034 12/18/17 2040   BP: (!) 86/60    Pulse: (!) 101    Resp: (!) 36    Temp: 100 °F (37.8 °C)    SpO2: 96%    Weight:  97.1 kg (214 lb)            Physical Exam   Constitutional: She is oriented to person, place, and time. She appears well-developed and well-nourished. Moderately ill appearing. Appears uncomfortable   HENT:   Head: Normocephalic and atraumatic. Eyes: Conjunctivae are normal. No scleral icterus. Neck: Neck supple. No tracheal deviation present. Cardiovascular: Regular rhythm, normal heart sounds and intact distal pulses. Tachycardia present. Exam reveals no gallop and no friction rub. No murmur heard. Pulmonary/Chest: Effort normal and breath sounds normal. She has no wheezes. She has no rales. Abdominal: Soft. She exhibits no distension. There is no tenderness. There is no rebound and no guarding. Musculoskeletal: She exhibits no edema. Neurological: She is alert and oriented to person, place, and time. Skin: Skin is warm and dry. No rash noted. Psychiatric: She has a normal mood and affect. Nursing note and vitals reviewed. Note written by rubén Higginbotham, as dictated by Yasmeen Lee MD 9:15 PM       [facility]  ED Course       Procedures    EKG: Sinus tach; rate - 111; NSSTTW abnl. Yasmeen Lee MD  10:21 PM    Consult note: Dr. Cady Flores - will admit. Yasmeen Lee MD 10:21 PM    Total critical care time spent exclusive of procedures:  35 min. Yasmeen Lee MD  10:21 PM    A/P: bilateral PE's S/P recent surgery - Lovenox; 30 cc/kg NS; initially hypotensive; HR in low 100's; normal sat's; BP has stabilized.   Yasmeen Lee MD  10:22 PM

## 2017-12-19 NOTE — ED NOTES
CT aware that patient is medicated and ready for scan, patient extremely anxious c/o pain in \"her lung. \" Updated on plan of care at this time, family remains at bedside.

## 2017-12-19 NOTE — PROGRESS NOTES
[facility]: MED RECONCILIATION      Medications added:   · Percocet      Information obtained from: Patient, spouse, RxQuery      Allergies: Review of patient's allergies indicates no known allergies. Prior to Admission Medications:     Medication Documentation Review Audit       Reviewed by Tessa Landaverde. Estrella Amos (Pharmacist) on 12/18/17 at 2203         Medication Sig Documenting Provider Last Dose Status Taking?      oxyCODONE-acetaminophen (PERCOCET) 5-325 mg per tablet Take 1 Tab by mouth every four (4) hours as needed for Pain. Historical Provider 12/16/2017 Active Yes                  Thank you,  Edie Sifuentes, Pharm. D.

## 2017-12-19 NOTE — ED NOTES
Verbal report given to Yulisa Griffith RN(name) on [facility] being transferred to [facility](unit) for routine progression of care    Report consisted of patient's Situation, Background, Assessment and Recommendations (SBAR)    Information from the following report(s)  SBAR, Kardex, ED Summary, Procedure Summary, Intake/Output, MAR, Recent Results and Cardiac Rhythm Sinus Tach was reviewed with the receiving nurse. Opportunity for questions and clarification was provided.     Patient transported with:  Monitor  Tech    Last Filed Values:  Temp: 100 °F (37.8 °C) (12/18/17 2034)  Pulse (Heart Rate): (!) 101 (12/19/17 0015)  Resp Rate: 17 (12/19/17 0015)  O2 Sat (%): 97 % (12/19/17 0015)  BP: 110/60 (12/19/17 0015)  MAP (Monitor): 71 (12/19/17 0015)  MAP (Calculated): 69 (12/18/17 2034)  Level of Consciousness: Alert (12/18/17 2034)        WBC   Date Value Ref Range Status   12/18/2017 14.0 (H) 3.6 - 11.0 K/uL Final       Blood Cultures Drawn:  yes    Initial Lactic Acid (LA):  Time 2044,  Result 1.3    Repeat LA:  Time Due N/A, Done & Result N/A    Fluid Restriction:  Total needed 2913mL, Status completed, amount 2913mL    All Antibiotics Started: Vancomycin yes, Levaquin no, Dose Due Levaquin 0100    VS x 2 post-fluid resuscitation:   yes    Vasopressor Infusion:  no N/A    Provider Reassessment needed and notified:  no , Due N/A    Additional Interventions/Comments:  N/A

## 2017-12-19 NOTE — PROGRESS NOTES
Bedside and Verbal shift change report given to Kosta Gaxiola (oncoming nurse) by HARINI (offgoing nurse). Report included the following information SBAR, Kardex, Intake/Output, MAR and Recent Results.

## 2017-12-19 NOTE — PROGRESS NOTES
I met with pt as an introductory visit. Pt resides with her  in [address] [place] [address]. Interview kept to a minimum today as pt was in acute pain which was being actively addressed by our treatment team.    Pt states she plans to call her surgeon in [place] today to notify him she has had post-surgical complications. Pt states she had the surgery performed in [place] as she has family in [place]. I discussed with pt that she will definitely be a candidate for the [facility] pneumonia visit but may actually need home health services  (depending upon if she needs home iv antibiotics  and if she needs wound care)    Pt has [employer] insurance which typically has excellent prescription coverage. Per attending,pt is having a CT of her abdomen today. Blood cultures are pending-will follow for resolution. Pt has no preferences for home health so will set pt up with [facility] if they have nursing availability when pt is ready for discharge. If home iv antibiotics are prescribed when discharged,I will set pt up with [facility]. Wound care has also been consulted.     For discharge needs,my number is [phone]  Team B with Dr Tone Fontana

## 2017-12-20 ENCOUNTER — HOME HEALTH ADMISSION (OUTPATIENT)
Dept: HOME HEALTH SERVICES | Facility: HOME HEALTH | Age: 37
End: 2017-12-20

## 2017-12-20 VITALS
BODY MASS INDEX: 32.87 KG/M2 | OXYGEN SATURATION: 94 % | RESPIRATION RATE: 19 BRPM | SYSTOLIC BLOOD PRESSURE: 96 MMHG | DIASTOLIC BLOOD PRESSURE: 63 MMHG | WEIGHT: 209.44 LBS | HEIGHT: 67 IN | TEMPERATURE: 98.2 F | HEART RATE: 94 BPM

## 2017-12-20 LAB
ANION GAP SERPL CALC-SCNC: 10 MMOL/L (ref 5–15)
BACTERIA SPEC CULT: NORMAL
BASOPHILS # BLD: 0 K/UL (ref 0–0.1)
BASOPHILS NFR BLD: 0 % (ref 0–1)
BUN SERPL-MCNC: 10 MG/DL (ref 6–20)
BUN/CREAT SERPL: 12 (ref 12–20)
CALCIUM SERPL-MCNC: 8.1 MG/DL (ref 8.5–10.1)
CC UR VC: NORMAL
CHLORIDE SERPL-SCNC: 104 MMOL/L (ref 97–108)
CO2 SERPL-SCNC: 24 MMOL/L (ref 21–32)
CREAT SERPL-MCNC: 0.85 MG/DL (ref 0.55–1.02)
EOSINOPHIL # BLD: 0.4 K/UL (ref 0–0.4)
EOSINOPHIL NFR BLD: 5 % (ref 0–7)
ERYTHROCYTE [DISTWIDTH] IN BLOOD BY AUTOMATED COUNT: 12.8 % (ref 11.5–14.5)
GLUCOSE SERPL-MCNC: 100 MG/DL (ref 65–100)
HCT VFR BLD AUTO: 32.8 % (ref 35–47)
HGB BLD-MCNC: 10.3 G/DL (ref 11.5–16)
L PNEUMO1 AG UR QL IA: NEGATIVE
LYMPHOCYTES # BLD: 2.1 K/UL (ref 0.8–3.5)
LYMPHOCYTES NFR BLD: 28 % (ref 12–49)
M PNEUMO IGG SER IA-ACNC: 296 U/ML (ref 0–99)
M PNEUMO IGM SER IA-ACNC: <770 U/ML (ref 0–769)
MAGNESIUM SERPL-MCNC: 1.4 MG/DL (ref 1.6–2.4)
MCH RBC QN AUTO: 28.3 PG (ref 26–34)
MCHC RBC AUTO-ENTMCNC: 31.4 G/DL (ref 30–36.5)
MCV RBC AUTO: 90.1 FL (ref 80–99)
MONOCYTES # BLD: 0.8 K/UL (ref 0–1)
MONOCYTES NFR BLD: 11 % (ref 5–13)
NEUTS SEG # BLD: 4.2 K/UL (ref 1.8–8)
NEUTS SEG NFR BLD: 56 % (ref 32–75)
PHOSPHATE SERPL-MCNC: 3.6 MG/DL (ref 2.6–4.7)
PLATELET # BLD AUTO: 170 K/UL (ref 150–400)
POTASSIUM SERPL-SCNC: 3.6 MMOL/L (ref 3.5–5.1)
RBC # BLD AUTO: 3.64 M/UL (ref 3.8–5.2)
SERVICE CMNT-IMP: NORMAL
SODIUM SERPL-SCNC: 138 MMOL/L (ref 136–145)
SPECIMEN SOURCE: NORMAL
WBC # BLD AUTO: 7.5 K/UL (ref 3.6–11)

## 2017-12-20 PROCEDURE — 74011250636 HC RX REV CODE- 250/636: Performed by: INTERNAL MEDICINE

## 2017-12-20 PROCEDURE — 83735 ASSAY OF MAGNESIUM: CPT | Performed by: INTERNAL MEDICINE

## 2017-12-20 PROCEDURE — 74011250637 HC RX REV CODE- 250/637: Performed by: INTERNAL MEDICINE

## 2017-12-20 PROCEDURE — 80048 BASIC METABOLIC PNL TOTAL CA: CPT | Performed by: INTERNAL MEDICINE

## 2017-12-20 PROCEDURE — 84100 ASSAY OF PHOSPHORUS: CPT | Performed by: INTERNAL MEDICINE

## 2017-12-20 PROCEDURE — 36415 COLL VENOUS BLD VENIPUNCTURE: CPT | Performed by: INTERNAL MEDICINE

## 2017-12-20 PROCEDURE — 85025 COMPLETE CBC W/AUTO DIFF WBC: CPT | Performed by: INTERNAL MEDICINE

## 2017-12-20 RX ORDER — LEVOFLOXACIN 750 MG/1
750 TABLET ORAL EVERY 24 HOURS
Qty: 5 TAB | Refills: 0 | Status: SHIPPED | OUTPATIENT
Start: 2017-12-20 | End: 2018-02-01

## 2017-12-20 RX ORDER — OXYCODONE AND ACETAMINOPHEN 7.5; 325 MG/1; MG/1
1 TABLET ORAL
Status: DISCONTINUED | OUTPATIENT
Start: 2017-12-20 | End: 2017-12-20 | Stop reason: HOSPADM

## 2017-12-20 RX ORDER — OXYCODONE AND ACETAMINOPHEN 7.5; 325 MG/1; MG/1
1 TABLET ORAL
Qty: 12 TAB | Refills: 0 | Status: SHIPPED | OUTPATIENT
Start: 2017-12-20 | End: 2018-02-01

## 2017-12-20 RX ORDER — DOCUSATE SODIUM 100 MG/1
100 CAPSULE, LIQUID FILLED ORAL DAILY
Status: DISCONTINUED | OUTPATIENT
Start: 2017-12-20 | End: 2017-12-20 | Stop reason: HOSPADM

## 2017-12-20 RX ORDER — PROCHLORPERAZINE EDISYLATE 5 MG/ML
10 INJECTION INTRAMUSCULAR; INTRAVENOUS
Status: DISCONTINUED | OUTPATIENT
Start: 2017-12-20 | End: 2017-12-20 | Stop reason: HOSPADM

## 2017-12-20 RX ORDER — DOXYCYCLINE HYCLATE 100 MG
100 TABLET ORAL EVERY 12 HOURS
Status: DISCONTINUED | OUTPATIENT
Start: 2017-12-20 | End: 2017-12-20 | Stop reason: HOSPADM

## 2017-12-20 RX ORDER — DOXYCYCLINE HYCLATE 100 MG
100 TABLET ORAL EVERY 12 HOURS
Qty: 10 TAB | Refills: 0 | Status: SHIPPED | OUTPATIENT
Start: 2017-12-20 | End: 2018-02-01

## 2017-12-20 RX ORDER — DOCUSATE SODIUM 100 MG/1
100 CAPSULE, LIQUID FILLED ORAL DAILY
Qty: 30 CAP | Refills: 2 | Status: SHIPPED | OUTPATIENT
Start: 2017-12-20 | End: 2018-02-01

## 2017-12-20 RX ORDER — PROMETHAZINE HYDROCHLORIDE 25 MG/1
25 TABLET ORAL
Qty: 20 TAB | Refills: 0 | Status: SHIPPED | OUTPATIENT
Start: 2017-12-20 | End: 2018-02-01

## 2017-12-20 RX ORDER — ONDANSETRON 2 MG/ML
4 INJECTION INTRAMUSCULAR; INTRAVENOUS
Status: DISCONTINUED | OUTPATIENT
Start: 2017-12-20 | End: 2017-12-20 | Stop reason: HOSPADM

## 2017-12-20 RX ORDER — LEVOFLOXACIN 750 MG/1
750 TABLET ORAL EVERY 24 HOURS
Status: DISCONTINUED | OUTPATIENT
Start: 2017-12-20 | End: 2017-12-20 | Stop reason: HOSPADM

## 2017-12-20 RX ADMIN — BACITRACIN ZINC, NEOMYCIN SULFATE, POLYMYXIN B SULFATE 1 PACKET: 3.5; 5000; 4 OINTMENT TOPICAL at 10:02

## 2017-12-20 RX ADMIN — PROCHLORPERAZINE EDISYLATE 10 MG: 5 INJECTION INTRAMUSCULAR; INTRAVENOUS at 11:11

## 2017-12-20 RX ADMIN — POLYETHYLENE GLYCOL 3350 17 G: 17 POWDER, FOR SOLUTION ORAL at 10:02

## 2017-12-20 RX ADMIN — LEVOFLOXACIN 750 MG: 5 INJECTION, SOLUTION INTRAVENOUS at 01:21

## 2017-12-20 RX ADMIN — DOXYCYCLINE HYCLATE 100 MG: 100 TABLET, COATED ORAL at 10:02

## 2017-12-20 RX ADMIN — Medication 10 ML: at 06:00

## 2017-12-20 RX ADMIN — Medication 10 ML: at 04:40

## 2017-12-20 RX ADMIN — DOCUSATE SODIUM 100 MG: 100 CAPSULE, LIQUID FILLED ORAL at 10:02

## 2017-12-20 RX ADMIN — APIXABAN 10 MG: 5 TABLET, FILM COATED ORAL at 10:02

## 2017-12-20 RX ADMIN — ONDANSETRON 4 MG: 2 INJECTION INTRAMUSCULAR; INTRAVENOUS at 08:03

## 2017-12-20 RX ADMIN — MORPHINE SULFATE 4 MG: 4 INJECTION INTRAVENOUS at 01:28

## 2017-12-20 RX ADMIN — MORPHINE SULFATE 4 MG: 4 INJECTION INTRAVENOUS at 08:03

## 2017-12-20 NOTE — PROGRESS NOTES
Discharge instructions, including information on new medications, were reviewed with patient and her . All questions were answered. IV and heart monitor were removed. Patient received her prescriptions and a copy of her discharge papers and will be discharged home with her . [address] visit was set up by case management.

## 2017-12-20 NOTE — PROGRESS NOTES
Pt is being discharged home today. I gave pt eliquis coupons to utilize and explained activation process to pt. I discussed pt with wound care nurse. Pt does not need home health for wound care. Pt can have her scripts filled @ [address] through [employer] or @ a local pharmacy. Attending is providing two eliquis scripts for pt so she can utilize the script for the zero dollars for one month of eliquis. I told pt the other coupion for a 10 $ co-pay will not usually work with  but to attempt it.  usually has a low co-pay. Pt informed me that if she gets her medications filled @ [facility],she gets her medications for free. Referral for [facility] pneumonia sent through [facility] link to [facility]. As pt cannot remamber the name of her PCP,I am not sure if they will accept pt. At any rate,pt is okay for discharge from a case management perspective.     Booker Glaser  Team B with Dr Irene Tamayo  [phone]

## 2017-12-20 NOTE — PROGRESS NOTES
Pharmacist Discharge Medication Reconciliation    Discharge Provider:  Kalin Low       Discharge Medications:      My Medications        STOP taking these medications              PERCOCET 5-325 mg per tablet   Generic drug:  oxyCODONE-acetaminophen   Replaced by:  oxyCODONE-acetaminophen 7.5-325 mg per tablet                 TAKE these medications as instructed         Instructions Each Dose to Equal   Morning Noon Evening Bedtime      apixaban 5 mg tablet   Commonly known as:  ELIQUIS       Your last dose was: Your next dose is: Take 2 tabs PO BID x 7 days then 1 tab PO BID thereafter                         docusate sodium 100 mg capsule   Commonly known as:  COLACE       Your last dose was: Your next dose is: Take 1 Cap by mouth daily for 90 days. 100 mg                        doxycycline 100 mg tablet   Commonly known as:  VIBRA-TABS       Your last dose was: Your next dose is: Take 1 Tab by mouth every twelve (12) hours. 100 mg                        levoFLOXacin 750 mg tablet   Commonly known as:  LEVAQUIN       Your last dose was: Your next dose is: Take 1 Tab by mouth every twenty-four (24) hours. 750 mg                        neomycin-bacitracnZn-polymyxnB 3.5-400-5,000 mg-unit-unit Oipk ointment   Commonly known as:  NEOSPORIN       Your last dose was: Your next dose is:              Apply 1 Packet to affected area daily. 1 Packet                        oxyCODONE-acetaminophen 7.5-325 mg per tablet   Commonly known as:  PERCOCET 7.5       Your last dose was: Your next dose is: Take 1 Tab by mouth every six (6) hours as needed. Max Daily Amount: 4 Tabs. 1 Tab                                 Where to Get Your Medications        Information on where to get these meds will be given to you by the nurse or doctor. !  Ask your nurse or doctor about these medications     apixaban 5 mg tablet    docusate sodium 100 mg capsule    doxycycline 100 mg tablet    levoFLOXacin 750 mg tablet    neomycin-bacitracnZn-polymyxnB 3.5-400-5,000 mg-unit-unit Oipk ointment    oxyCODONE-acetaminophen 7.5-325 mg per tablet                 The patient's chart, MAR, and AVS were reviewed by   Brennan Rosenberg, [address],   Contact: [phone]

## 2017-12-20 NOTE — DISCHARGE SUMMARY
Physician Discharge Summary     Patient ID:  Ashlie Turcios  [id number]  11 y.o.  1980    Admit date: 12/18/2017    Discharge date and time: 12/20/2017    Admission Diagnoses: Pulmonary emboli [facility])    Discharge Diagnoses:    Principal Diagnosis   Pulmonary emboli ([facility] [address]                                             Other Diagnoses  Principal Problem:    Pulmonary emboli ([facility] [address] (12/18/2017)    Active Problems:    SIRS (systemic inflammatory response syndrome) ([facility] [address] (12/18/2017)      Cellulitis (12/18/2017)      Obesity (12/18/2017)         Hospital Course:     Pulmonary embolus: noted on CTA. Having continued pleuritic chest pain. Transition to eliquis      Bilateral pneumonia / pleural effusions: noted on CT chest. Transition to Levaquin and doxycycline for total of 7 days.      Chest pain: pleuritic, 2/2 above. Pain medication as needed      Cellulitis abdominal wall: associated with some purulent drainage at surgical site. CT abd has r/o abscess. Wound care recommends daily neosporin and massaging incision.      Sepsis: 2/4 SIRS POA associated with cellulitis. BC NGTD. Levaquin and doxy as above      Hypotension: now resolved, currently receiving 3L IVF in ER. Related to sepsis but not severe sepsis as no specific end-organ damage      Obesity: advised weight loss    PCP: [place] Pa, MD    Consults: None    Significant Diagnostic Studies: See Hospital Course    Discharged home in improved condition.     Discharge Exam:    Visit Vitals    BP 96/63 (BP 1 Location: Right arm, BP Patient Position: At rest)    Pulse 94    Temp 98.2 °F (36.8 °C)    Resp 19    Ht 5' 6.5\" (1.689 m)    Wt 95 kg (209 lb 7 oz)    SpO2 94%    Breastfeeding No    BMI 33.3 kg/m2          Physical Exam:     Gen:  Well-developed, well-nourished, in no acute distress  HEENT:  Pink conjunctivae, PERRL, hearing intact to voice, moist mucous membranes  Neck:  Supple, without masses, thyroid non-tender  Resp:  No accessory muscle use, clear breath sounds without wheezes rales or rhonchi  Card:  No murmurs, normal S1, S2 without thrills, bruits or peripheral edema  Abd:  Soft, non-tender, non-distended, normoactive bowel sounds are present, no palpable organomegaly and no detectable hernias  Lymph:  No cervical or inguinal adenopathy  Musc:  No cyanosis or clubbing  Skin:  No rashes or ulcers, skin turgor is good  Neuro:  Cranial nerves are grossly intact, no focal motor weakness, follows commands appropriately  Psych:  Good insight, oriented to person, place and time, alert    Disposition: home    Patient Instructions:   Current Discharge Medication List      START taking these medications    Details   promethazine (PHENERGAN) 25 mg tablet Take 1 Tab by mouth every six (6) hours as needed for Nausea. Qty: 20 Tab, Refills: 0      apixaban (ELIQUIS) 5 mg tablet Take 2 tabs PO BID x 7 days then 1 tab PO BID thereafter  Qty: 74 Tab, Refills: 0      docusate sodium (COLACE) 100 mg capsule Take 1 Cap by mouth daily for 90 days. Qty: 30 Cap, Refills: 2      doxycycline (VIBRA-TABS) 100 mg tablet Take 1 Tab by mouth every twelve (12) hours. Qty: 10 Tab, Refills: 0      levoFLOXacin (LEVAQUIN) 750 mg tablet Take 1 Tab by mouth every twenty-four (24) hours. Qty: 5 Tab, Refills: 0      neomycin-bacitracnZn-polymyxnB (NEOSPORIN) 3.5-400-5,000 mg-unit-unit oipk ointment Apply 1 Packet to affected area daily. Qty: 30 Packet, Refills: 0      oxyCODONE-acetaminophen (PERCOCET 7.5) 7.5-325 mg per tablet Take 1 Tab by mouth every six (6) hours as needed. Max Daily Amount: 4 Tabs.   Qty: 12 Tab, Refills: 0         STOP taking these medications       oxyCODONE-acetaminophen (PERCOCET) 5-325 mg per tablet Comments:   Reason for Stopping:             Activity: Activity as tolerated  Diet: Resume previous diet  Wound Care: As directed    Follow-up with PCP in 1-2 weeks    Signed:  [address], DO  12/20/2017  11:50 AM    Greater than 30 mins was spent in coordination, counseling, and execution of this patient's discharge

## 2017-12-20 NOTE — ROUTINE PROCESS
12/20/17  10:22AM  Called pt in her room to discuss setting up PCP f/u appt. Pt says that she switched PCPs but doesn't know his name, but can call her insurance company to find out. Asked pt if she wants to stay with that provider and if she is comfortable contacting the insurance company and setting up the appointment. Pt confirmed that she has the resources she needs to follow-up post-hospitalization.  Kenyon Mcghee CM Specialist

## 2017-12-20 NOTE — PROGRESS NOTES
Problem: Falls - Risk of  Goal: *Absence of Falls  Document Han Fall Risk and appropriate interventions in the flowsheet.    Outcome: Progressing Towards Goal  Fall Risk Interventions:            Medication Interventions: Patient to call before getting OOB, Teach patient to arise slowly, Bed/chair exit alarm

## 2017-12-20 NOTE — PROGRESS NOTES
[address]  Cat scan called to order contrast \"gastrografin\" 30 ml, mix with 2 8 oz of ginger ale. Called MD Hermes Jeter, to confirm, telephone read back order obtained for med. 2346 Pt back in room from scan, called pharmacy to check if pt can have her rest of her night meds. Pharmacist said is fine. 0550 Pt requested crackers, ginger ale given, pt still has nausea. Called Md Hermes Jeter, telephone read back order: Zofran 4 mg IV every 8 hrs as needed. 0560 Pt was asleep when med was taken to her. Bedside and Verbal shift change report given to [address] (oncoming nurse) by Jenny Cortés RN (offgoing nurse). Report included the following information SBAR, Kardex and MAR, sinus rhythm.

## 2017-12-23 LAB
BACTERIA SPEC CULT: NORMAL
SERVICE CMNT-IMP: NORMAL

## 2017-12-27 ENCOUNTER — HOME CARE VISIT (OUTPATIENT)
Dept: HOME HEALTH SERVICES | Facility: HOME HEALTH | Age: 37
End: 2017-12-27

## 2017-12-28 ENCOUNTER — HOME CARE VISIT (OUTPATIENT)
Dept: SCHEDULING | Facility: HOME HEALTH | Age: 37
End: 2017-12-28

## 2017-12-28 PROCEDURE — G0495 RN CARE TRAIN/EDU IN HH: HCPCS

## 2018-02-01 ENCOUNTER — HOSPITAL ENCOUNTER (EMERGENCY)
Age: 38
Discharge: HOME | End: 2018-02-01
Attending: EMERGENCY MEDICINE
Payer: OTHER GOVERNMENT

## 2018-02-01 VITALS
RESPIRATION RATE: 20 BRPM | SYSTOLIC BLOOD PRESSURE: 118 MMHG | TEMPERATURE: 98.2 F | DIASTOLIC BLOOD PRESSURE: 76 MMHG | OXYGEN SATURATION: 100 % | HEIGHT: 67 IN | BODY MASS INDEX: 32.53 KG/M2 | WEIGHT: 207.23 LBS

## 2018-02-01 DIAGNOSIS — I27.82 OTHER CHRONIC PULMONARY EMBOLISM WITHOUT ACUTE COR PULMONALE (HCC): Primary | ICD-10-CM

## 2018-02-01 LAB
ANION GAP SERPL CALC-SCNC: 8 MMOL/L (ref 5–15)
BASOPHILS # BLD: 0.1 K/UL (ref 0–0.1)
BASOPHILS NFR BLD: 1 % (ref 0–1)
BUN SERPL-MCNC: 8 MG/DL (ref 6–20)
BUN/CREAT SERPL: 8 (ref 12–20)
CALCIUM SERPL-MCNC: 8.2 MG/DL (ref 8.5–10.1)
CHLORIDE SERPL-SCNC: 106 MMOL/L (ref 97–108)
CO2 SERPL-SCNC: 29 MMOL/L (ref 21–32)
CREAT SERPL-MCNC: 1.01 MG/DL (ref 0.55–1.02)
D DIMER PPP FEU-MCNC: 0.31 MG/L FEU (ref 0–0.65)
DIFFERENTIAL METHOD BLD: NORMAL
EOSINOPHIL # BLD: 0.3 K/UL (ref 0–0.4)
EOSINOPHIL NFR BLD: 4 % (ref 0–7)
ERYTHROCYTE [DISTWIDTH] IN BLOOD BY AUTOMATED COUNT: 12.2 % (ref 11.5–14.5)
GLUCOSE SERPL-MCNC: 91 MG/DL (ref 65–100)
HCT VFR BLD AUTO: 38 % (ref 35–47)
HGB BLD-MCNC: 12.4 G/DL (ref 11.5–16)
IMM GRANULOCYTES # BLD: 0 K/UL (ref 0–0.04)
IMM GRANULOCYTES NFR BLD AUTO: 0 % (ref 0–0.5)
LYMPHOCYTES # BLD: 3.3 K/UL (ref 0.8–3.5)
LYMPHOCYTES NFR BLD: 48 % (ref 12–49)
MCH RBC QN AUTO: 28.6 PG (ref 26–34)
MCHC RBC AUTO-ENTMCNC: 32.6 G/DL (ref 30–36.5)
MCV RBC AUTO: 87.6 FL (ref 80–99)
MONOCYTES # BLD: 0.6 K/UL (ref 0–1)
MONOCYTES NFR BLD: 9 % (ref 5–13)
NEUTS SEG # BLD: 2.6 K/UL (ref 1.8–8)
NEUTS SEG NFR BLD: 38 % (ref 32–75)
NRBC # BLD: 0 K/UL (ref 0–0.01)
NRBC BLD-RTO: 0 PER 100 WBC
PLATELET # BLD AUTO: 202 K/UL (ref 150–400)
PMV BLD AUTO: 11.5 FL (ref 8.9–12.9)
POTASSIUM SERPL-SCNC: 3.6 MMOL/L (ref 3.5–5.1)
RBC # BLD AUTO: 4.34 M/UL (ref 3.8–5.2)
SODIUM SERPL-SCNC: 143 MMOL/L (ref 136–145)
WBC # BLD AUTO: 6.9 K/UL (ref 3.6–11)

## 2018-02-01 PROCEDURE — 80048 BASIC METABOLIC PNL TOTAL CA: CPT | Performed by: EMERGENCY MEDICINE

## 2018-02-01 PROCEDURE — 99283 EMERGENCY DEPT VISIT LOW MDM: CPT

## 2018-02-01 PROCEDURE — 85025 COMPLETE CBC W/AUTO DIFF WBC: CPT | Performed by: EMERGENCY MEDICINE

## 2018-02-01 PROCEDURE — 36415 COLL VENOUS BLD VENIPUNCTURE: CPT | Performed by: EMERGENCY MEDICINE

## 2018-02-01 PROCEDURE — 85379 FIBRIN DEGRADATION QUANT: CPT | Performed by: EMERGENCY MEDICINE

## 2018-02-01 PROCEDURE — 93005 ELECTROCARDIOGRAM TRACING: CPT

## 2018-02-02 LAB
ATRIAL RATE: 77 BPM
CALCULATED R AXIS, ECG10: 131 DEGREES
CALCULATED T AXIS, ECG11: 142 DEGREES
DIAGNOSIS, 93000: NORMAL
P-R INTERVAL, ECG05: 170 MS
Q-T INTERVAL, ECG07: 394 MS
QRS DURATION, ECG06: 86 MS
QTC CALCULATION (BEZET), ECG08: 445 MS
VENTRICULAR RATE, ECG03: 77 BPM

## 2018-02-02 NOTE — ED NOTES
MD has reviewed discharge instructions with the patient. The patient verbalized understanding. Pt confirmed understanding of need for follow up with primary care provider. Pt is not in any current distress and shows no evidence of clinical instability. Pt left ambulatory  Pt family/friends are present. Pt left with all personal belongings. Pt states they are driving. Pt states chief complaint has improved with treatment provided    PT is alert and oriented x 4, Pt is hemodynamically/respiratorily stable. Paperwork given by provider and reviewed with patient, opportunity for questions/clarification given.

## 2018-02-02 NOTE — ED PROVIDER NOTES
HPI Comments: 40 y.o. female with no significant past medical history who presents from home with chief complaint of SOB. Per pt, she has hx of PE diagnosis 1x month ago and is currently taking Eliquis. Over the past week, she reports experiencing mild SOB. Due to her hx of recent PE, the pt states she became very anxious and decided to see her PCP on Monday (1/29/2018). She was instructed at that time to be seen in the ED for evaluation. Today the pt notes that she woke up with a mild-moderate headache which has since resolved. Through out the day, the pt adds that she has also experienced dizziness. She makes it known that her daughter has appeared with flu like symptoms recently, however she has not experienced similar symptoms thus far. Pt denies fever, chills, N/V/D, CP, cough, back pain and urinary symptoms. There are no other acute medical concerns at this time. Social hx: Non-smoker, No current ETOH consumption    PCP: [place] Pa MD    Note written by Sanam Rogers, as dictated by Elbert Castleman, MD 7:11 PM          The history is provided by the patient. No  was used. No past medical history on file. No past surgical history on file. No family history on file. Social History     Social History    Marital status:      Spouse name: N/A    Number of children: N/A    Years of education: N/A     Occupational History    Not on file. Social History Main Topics    Smoking status: Never Smoker    Smokeless tobacco: Never Used    Alcohol use No    Drug use: Not on file    Sexual activity: Not on file     Other Topics Concern    Not on file     Social History Narrative    No narrative on file         ALLERGIES: Review of patient's allergies indicates no known allergies. Review of Systems   Constitutional: Negative for activity change, appetite change, chills, fatigue and fever.    HENT: Negative for ear pain, facial swelling, sore throat and trouble swallowing. Eyes: Negative for pain, discharge and visual disturbance. Respiratory: Positive for shortness of breath (Intermittent for the past week ). Negative for cough, chest tightness and wheezing. Cardiovascular: Negative for chest pain and palpitations. Gastrointestinal: Negative for abdominal pain, blood in stool, nausea and vomiting. Genitourinary: Negative for difficulty urinating, flank pain and hematuria. Musculoskeletal: Negative for arthralgias, joint swelling, myalgias and neck pain. Skin: Negative for color change and rash. Neurological: Negative for dizziness, weakness, numbness and headaches. Hematological: Negative for adenopathy. Does not bruise/bleed easily. Psychiatric/Behavioral: Negative for behavioral problems, confusion and sleep disturbance. All other systems reviewed and are negative. Vitals:    02/01/18 1845   BP: 118/76   Resp: 20   Temp: 98.2 °F (36.8 °C)   SpO2: 100%   Weight: 94 kg (207 lb 3.7 oz)   Height: 5' 6.5\" (1.689 m)            Physical Exam   Constitutional: She appears well-developed and well-nourished. HENT:   Head: Normocephalic and atraumatic. Mouth/Throat: Oropharynx is clear and moist.   Eyes: EOM are normal. Pupils are equal, round, and reactive to light. Neck: Normal range of motion. Neck supple. Cardiovascular: Normal rate, regular rhythm, normal heart sounds and intact distal pulses. Exam reveals no gallop and no friction rub. No murmur heard. Pulmonary/Chest: Effort normal. No respiratory distress. She has no wheezes. She has no rales. Abdominal: Soft. There is no tenderness. There is no rebound. Musculoskeletal: Normal range of motion. She exhibits no tenderness. Neurological: She is alert. No cranial nerve deficit. Motor; symmetric   Skin: No erythema. Psychiatric: She has a normal mood and affect. Her behavior is normal.   Nursing note and vitals reviewed.      Note written by Sanam Ramos, as dictated by Ellie Carbone MD 7:11 PM    MDM      ED Course       Procedures

## 2018-10-11 ENCOUNTER — ED HISTORICAL/CONVERTED ENCOUNTER (OUTPATIENT)
Dept: OTHER | Age: 38
End: 2018-10-11

## 2018-12-31 ENCOUNTER — HOSPITAL ENCOUNTER (EMERGENCY)
Age: 38
Discharge: HOME | End: 2018-12-31
Attending: EMERGENCY MEDICINE
Payer: OTHER GOVERNMENT

## 2018-12-31 VITALS
SYSTOLIC BLOOD PRESSURE: 138 MMHG | HEART RATE: 85 BPM | BODY MASS INDEX: 34.74 KG/M2 | WEIGHT: 221.34 LBS | RESPIRATION RATE: 18 BRPM | TEMPERATURE: 98.2 F | HEIGHT: 67 IN | DIASTOLIC BLOOD PRESSURE: 84 MMHG | OXYGEN SATURATION: 100 %

## 2018-12-31 DIAGNOSIS — K43.2 INCISIONAL HERNIA, WITHOUT OBSTRUCTION OR GANGRENE: Primary | ICD-10-CM

## 2018-12-31 PROCEDURE — 99282 EMERGENCY DEPT VISIT SF MDM: CPT

## 2018-12-31 RX ORDER — DICYCLOMINE HYDROCHLORIDE 20 MG/1
20 TABLET ORAL
Qty: 20 TAB | Refills: 0 | Status: SHIPPED | OUTPATIENT
Start: 2018-12-31 | End: 2019-05-28

## 2018-12-31 NOTE — DISCHARGE INSTRUCTIONS

## 2018-12-31 NOTE — ED TRIAGE NOTES
Patient arrives with c/o \"hard\" area and abdominal pain, located below navel x 1 month with worsening.

## 2018-12-31 NOTE — ED PROVIDER NOTES
45 y.o. Female who presents ambulatory to the ED, with chief complaint of suprapubic abdominal pain. Pt reports constant suprapubic abdominal pain that started ~1 month ago and has been worsening over time. She rates her current pain as 3/10 in intensity. Pt reports that the pain is around an incision site from abdominoplasty performed November 2017 in [place]. She notes that her pain is exacerbated with movement, and reports that her abdomen becomes \"hard\" at times. Denies radiation of pain to other areas. She also denies seeing anyone for her pain prior to today or performing any recent strenuous activity. Pt specifically denies nausea, vomiting, diarrhea, constipation or change in appetite. No F, urinary sx's; she suspects the sx's are related to her \"tummy tuck\" surgery and feels the surgeon did a poor job. There are no other acute medical concerns at this time. Social hx: Negative for Tobacco use; Negative for EtOH use; 
PCP: Other, MD [place] 
 
Note written by Sanam Hall, as dictated by Yousif Diamond MD 5:36 PM 
 
 
 
 
The history is provided by the patient. No  was used. No past medical history on file. No past surgical history on file. No family history on file. Social History Socioeconomic History  Marital status:  Spouse name: Not on file  Number of children: Not on file  Years of education: Not on file  Highest education level: Not on file Social Needs  Financial resource strain: Not on file  Food insecurity - worry: Not on file  Food insecurity - inability: Not on file  Transportation needs - medical: Not on file  Transportation needs - non-medical: Not on file Occupational History  Not on file Tobacco Use  Smoking status: Never Smoker  Smokeless tobacco: Never Used Substance and Sexual Activity  Alcohol use: No  
 Drug use: Not on file  Sexual activity: Not on file Other Topics Concern  Not on file Social History Narrative  Not on file ALLERGIES: Patient has no known allergies. Review of Systems Constitutional: Negative for appetite change. Gastrointestinal: Positive for abdominal pain (suprapubic). Negative for constipation, diarrhea, nausea and vomiting. All other systems reviewed and are negative. Vitals:  
 12/31/18 1735 BP: 138/84 Pulse: 85 Resp: 18 Temp: 98.2 °F (36.8 °C) SpO2: 100% Weight: 100.4 kg (221 lb 5.5 oz) Height: 5' 7\" (1.702 m) Physical Exam  
Constitutional: She appears well-developed and well-nourished. HENT:  
Head: Normocephalic and atraumatic. Eyes: Conjunctivae are normal.  
Neck: No tracheal deviation present. Cardiovascular: Normal rate. Pulmonary/Chest: Effort normal.  
Abdominal: Soft. She exhibits no distension. Benign abd exam; large lower abd surgical scar noted; no palpable hernia; no tenderness on exam.    
Musculoskeletal: She exhibits no edema. Neurological: She is alert. Skin: Skin is dry. Psychiatric: She has a normal mood and affect. Nursing note and vitals reviewed. MDM Procedures A/P: pt has had abd pain for about a month at her scar site from a previous tummy tuck surgery; she says a bulge appears over a certain part of the scar at times; I suspect she may have an incisional hernia; her exam is normal - no palpable hernia on my exam; she is in no distress; I do not feel labs or imaging will be helpful; she clinically does not have an incarcerated hernia; I have referred her to surgery for follow-up and encouraged her to return if her sx's worsen.   Rashad Paz MD

## 2019-05-28 ENCOUNTER — HOSPITAL ENCOUNTER (EMERGENCY)
Age: 39
Discharge: HOME | End: 2019-05-28
Attending: EMERGENCY MEDICINE
Payer: OTHER GOVERNMENT

## 2019-05-28 ENCOUNTER — APPOINTMENT (OUTPATIENT)
Dept: CT IMAGING | Age: 39
End: 2019-05-28
Attending: EMERGENCY MEDICINE
Payer: OTHER GOVERNMENT

## 2019-05-28 VITALS
OXYGEN SATURATION: 99 % | TEMPERATURE: 98.6 F | RESPIRATION RATE: 16 BRPM | SYSTOLIC BLOOD PRESSURE: 137 MMHG | BODY MASS INDEX: 34.69 KG/M2 | WEIGHT: 221 LBS | HEIGHT: 67 IN | DIASTOLIC BLOOD PRESSURE: 78 MMHG | HEART RATE: 78 BPM

## 2019-05-28 DIAGNOSIS — G43.501 PERSISTENT MIGRAINE AURA WITHOUT CEREBRAL INFARCTION AND WITH STATUS MIGRAINOSUS, NOT INTRACTABLE: Primary | ICD-10-CM

## 2019-05-28 LAB
ANION GAP SERPL CALC-SCNC: 3 MMOL/L (ref 5–15)
BASOPHILS # BLD: 0.1 K/UL (ref 0–0.1)
BASOPHILS NFR BLD: 1 % (ref 0–1)
BUN SERPL-MCNC: 10 MG/DL (ref 6–20)
BUN/CREAT SERPL: 9 (ref 12–20)
CALCIUM SERPL-MCNC: 8.9 MG/DL (ref 8.5–10.1)
CHLORIDE SERPL-SCNC: 107 MMOL/L (ref 97–108)
CO2 SERPL-SCNC: 28 MMOL/L (ref 21–32)
COMMENT, HOLDF: NORMAL
CREAT SERPL-MCNC: 1.1 MG/DL (ref 0.55–1.02)
DIFFERENTIAL METHOD BLD: ABNORMAL
EOSINOPHIL # BLD: 0.3 K/UL (ref 0–0.4)
EOSINOPHIL NFR BLD: 3 % (ref 0–7)
ERYTHROCYTE [DISTWIDTH] IN BLOOD BY AUTOMATED COUNT: 12.4 % (ref 11.5–14.5)
GLUCOSE SERPL-MCNC: 95 MG/DL (ref 65–100)
HCG UR QL: NEGATIVE
HCT VFR BLD AUTO: 44.1 % (ref 35–47)
HGB BLD-MCNC: 13.9 G/DL (ref 11.5–16)
IMM GRANULOCYTES # BLD AUTO: 0.1 K/UL (ref 0–0.04)
IMM GRANULOCYTES NFR BLD AUTO: 1 % (ref 0–0.5)
LYMPHOCYTES # BLD: 3.1 K/UL (ref 0.8–3.5)
LYMPHOCYTES NFR BLD: 37 % (ref 12–49)
MCH RBC QN AUTO: 28.4 PG (ref 26–34)
MCHC RBC AUTO-ENTMCNC: 31.5 G/DL (ref 30–36.5)
MCV RBC AUTO: 90 FL (ref 80–99)
MONOCYTES # BLD: 0.6 K/UL (ref 0–1)
MONOCYTES NFR BLD: 8 % (ref 5–13)
NEUTS SEG # BLD: 4.1 K/UL (ref 1.8–8)
NEUTS SEG NFR BLD: 50 % (ref 32–75)
NRBC # BLD: 0 K/UL (ref 0–0.01)
NRBC BLD-RTO: 0 PER 100 WBC
PLATELET # BLD AUTO: 227 K/UL (ref 150–400)
PMV BLD AUTO: 11.2 FL (ref 8.9–12.9)
POTASSIUM SERPL-SCNC: 3.7 MMOL/L (ref 3.5–5.1)
RBC # BLD AUTO: 4.9 M/UL (ref 3.8–5.2)
SAMPLES BEING HELD,HOLD: NORMAL
SODIUM SERPL-SCNC: 138 MMOL/L (ref 136–145)
WBC # BLD AUTO: 8.2 K/UL (ref 3.6–11)

## 2019-05-28 PROCEDURE — 85025 COMPLETE CBC W/AUTO DIFF WBC: CPT

## 2019-05-28 PROCEDURE — 36415 COLL VENOUS BLD VENIPUNCTURE: CPT

## 2019-05-28 PROCEDURE — 80048 BASIC METABOLIC PNL TOTAL CA: CPT

## 2019-05-28 PROCEDURE — 74011250636 HC RX REV CODE- 250/636: Performed by: EMERGENCY MEDICINE

## 2019-05-28 PROCEDURE — 81025 URINE PREGNANCY TEST: CPT

## 2019-05-28 PROCEDURE — 70450 CT HEAD/BRAIN W/O DYE: CPT

## 2019-05-28 PROCEDURE — 96361 HYDRATE IV INFUSION ADD-ON: CPT

## 2019-05-28 PROCEDURE — 99283 EMERGENCY DEPT VISIT LOW MDM: CPT

## 2019-05-28 PROCEDURE — 96375 TX/PRO/DX INJ NEW DRUG ADDON: CPT

## 2019-05-28 PROCEDURE — 96374 THER/PROPH/DIAG INJ IV PUSH: CPT

## 2019-05-28 RX ORDER — BUTALBITAL, ACETAMINOPHEN AND CAFFEINE 300; 40; 50 MG/1; MG/1; MG/1
1 CAPSULE ORAL
Qty: 10 CAP | Refills: 0 | Status: SHIPPED | OUTPATIENT
Start: 2019-05-28 | End: 2022-01-13 | Stop reason: ALTCHOICE

## 2019-05-28 RX ORDER — PROCHLORPERAZINE EDISYLATE 5 MG/ML
10 INJECTION INTRAMUSCULAR; INTRAVENOUS
Status: COMPLETED | OUTPATIENT
Start: 2019-05-28 | End: 2019-05-28

## 2019-05-28 RX ORDER — KETOROLAC TROMETHAMINE 30 MG/ML
15 INJECTION, SOLUTION INTRAMUSCULAR; INTRAVENOUS
Status: COMPLETED | OUTPATIENT
Start: 2019-05-28 | End: 2019-05-28

## 2019-05-28 RX ORDER — DIPHENHYDRAMINE HYDROCHLORIDE 50 MG/ML
25 INJECTION, SOLUTION INTRAMUSCULAR; INTRAVENOUS
Status: COMPLETED | OUTPATIENT
Start: 2019-05-28 | End: 2019-05-28

## 2019-05-28 RX ORDER — ONDANSETRON 4 MG/1
4 TABLET, ORALLY DISINTEGRATING ORAL
Qty: 10 TAB | Refills: 0 | Status: SHIPPED | OUTPATIENT
Start: 2019-05-28 | End: 2020-02-05

## 2019-05-28 RX ADMIN — SODIUM CHLORIDE 1000 ML: 900 INJECTION, SOLUTION INTRAVENOUS at 19:26

## 2019-05-28 RX ADMIN — PROCHLORPERAZINE EDISYLATE 10 MG: 5 INJECTION INTRAMUSCULAR; INTRAVENOUS at 19:30

## 2019-05-28 RX ADMIN — DIPHENHYDRAMINE HYDROCHLORIDE 25 MG: 50 INJECTION INTRAMUSCULAR; INTRAVENOUS at 19:26

## 2019-05-28 RX ADMIN — KETOROLAC TROMETHAMINE 15 MG: 30 INJECTION, SOLUTION INTRAMUSCULAR; INTRAVENOUS at 20:08

## 2019-05-28 NOTE — ED PROVIDER NOTES
Chela Rose is a 45 y.o. female who presents ambulatory to the ED with a c/o headache, dizziness today. Pt currently rates her pain at 6/10 in severity. Pt states that she has been having frontal headaches intermittently for the past 6-7 months but states that her headache is worse today. Reports they are normally worse in the mornings and better as the day progresses. She took caffeine today which seemed to help. She additionally c/o nausea, dizziness and bilateral blurred vision. Pt specifically denies chest pain, shortness of breath, vomiting , fever, chills, numbness, tingling, abdominal pain, back pain, cough, leg swelling, or any other acute sx. Pt denies any recent travel, known sick contacts, or recent illness. PCP: Th[place], MD [place]  PMHx significant for: none  PSHx significant for: none  Social Hx: Tobacco: none EtOH: none Illicit drug use: none    There are no further complaints or symptoms at this time. Signed by: linette Castro for Mariaelena Christine MD  on May 28th, 2019 at 6:43pm.             No past medical history on file. No past surgical history on file. No family history on file.     Social History     Socioeconomic History    Marital status:      Spouse name: Not on file    Number of children: Not on file    Years of education: Not on file    Highest education level: Not on file   Occupational History    Not on file   Social Needs    Financial resource strain: Not on file    Food insecurity:     Worry: Not on file     Inability: Not on file    Transportation needs:     Medical: Not on file     Non-medical: Not on file   Tobacco Use    Smoking status: Never Smoker    Smokeless tobacco: Never Used   Substance and Sexual Activity    Alcohol use: No    Drug use: Not on file    Sexual activity: Not on file   Lifestyle    Physical activity:     Days per week: Not on file     Minutes per session: Not on file    Stress: Not on file   Relationships  Social connections:     Talks on phone: Not on file     Gets together: Not on file     Attends Congregation service: Not on file     Active member of club or organization: Not on file     Attends meetings of clubs or organizations: Not on file     Relationship status: Not on file    Intimate partner violence:     Fear of current or ex partner: Not on file     Emotionally abused: Not on file     Physically abused: Not on file     Forced sexual activity: Not on file   Other Topics Concern    Not on file   Social History Narrative    Not on file         ALLERGIES: Patient has no known allergies. Review of Systems   Constitutional: Negative for chills and fever. Eyes: Positive for visual disturbance. Respiratory: Negative for cough and shortness of breath. Cardiovascular: Negative for chest pain. Gastrointestinal: Positive for diarrhea and nausea. Negative for abdominal pain and vomiting. Musculoskeletal: Negative for arthralgias. Neurological: Positive for dizziness, speech difficulty and headaches. Negative for weakness and numbness. Reports sometimes when she gets HA she has trouble finding her words. This resolves when HA resolves and has been occurring for months   All other systems reviewed and are negative. There were no vitals filed for this visit. Physical Exam   Constitutional: She is oriented to person, place, and time. She appears well-developed and well-nourished. HENT:   Head: Normocephalic and atraumatic. Eyes: Conjunctivae are normal.   Neck: Normal range of motion. Cardiovascular: Normal rate, regular rhythm, normal heart sounds and intact distal pulses. No murmur heard. Pulmonary/Chest: Effort normal and breath sounds normal. No stridor. No respiratory distress. She has no wheezes. She has no rales. Abdominal: Soft. Bowel sounds are normal. She exhibits no distension and no mass. There is no tenderness. There is no rebound and no guarding. Musculoskeletal: Normal range of motion. Neurological: She is alert and oriented to person, place, and time. No cranial nerve deficit. Coordination normal.   Skin: Skin is warm and dry. Nursing note and vitals reviewed. MDM  Number of Diagnoses or Management Options  Persistent migraine aura without cerebral infarction and with status migrainosus, not intractable:   Diagnosis management comments: Given morning HA eval for brain mass though these sound more migraine like given speech difficulty and improvement with caffeine though pt unsure of triggers over past few months. irreg menses is not new       Amount and/or Complexity of Data Reviewed  Clinical lab tests: ordered and reviewed  Tests in the radiology section of CPT®: ordered and reviewed    Patient Progress  Patient progress: stable         Procedures    PROGRESS NOTE:  8:04 PM  Pt's headache is completely resolved and she is feeling better    8:23 PM  Patient's results have been reviewed with them. Patient and/or family have verbally conveyed their understanding and agreement of the patient's signs, symptoms, diagnosis, treatment and prognosis and additionally agree to follow up as recommended or return to the Emergency Room should their condition change prior to follow-up. Discharge instructions have also been provided to the patient with some educational information regarding their diagnosis as well a list of reasons why they would want to return to the ER prior to their follow-up appointment should their condition change.

## 2019-05-28 NOTE — ED TRIAGE NOTES
Complains of headache pain since Thrursday with dizziness and nausea. She also states she developed some diarrhea today. Denies history of migraine headache pain.

## 2019-05-29 NOTE — DISCHARGE INSTRUCTIONS
Patient Education        Migraine Headache: Care Instructions  Your Care Instructions  Migraines are painful, throbbing headaches that often start on one side of the head. They may cause nausea and vomiting and make you sensitive to light, sound, or smell. Without treatment, migraines can last from 4 hours to a few days. Medicines can help prevent migraines or stop them after they have started. Your doctor can help you find which ones work best for you. Follow-up care is a key part of your treatment and safety. Be sure to make and go to all appointments, and call your doctor if you are having problems. It's also a good idea to know your test results and keep a list of the medicines you take. How can you care for yourself at home? · Do not drive if you have taken a prescription pain medicine. · Rest in a quiet, dark room until your headache is gone. Close your eyes, and try to relax or go to sleep. Don't watch TV or read. · Put a cold, moist cloth or cold pack on the painful area for 10 to 20 minutes at a time. Put a thin cloth between the cold pack and your skin. · Use a warm, moist towel or a heating pad set on low to relax tight shoulder and neck muscles. · Have someone gently massage your neck and shoulders. · Take your medicines exactly as prescribed. Call your doctor if you think you are having a problem with your medicine. You will get more details on the specific medicines your doctor prescribes. · Be careful not to take pain medicine more often than the instructions allow. You could get worse or more frequent headaches when the medicine wears off. To prevent migraines  · Keep a headache diary so you can figure out what triggers your headaches. Avoiding triggers may help you prevent headaches. Record when each headache began, how long it lasted, and what the pain was like.  (Was it throbbing, aching, stabbing, or dull?) Write down any other symptoms you had with the headache, such as nausea, flashing lights or dark spots, or sensitivity to bright light or loud noise. Note if the headache occurred near your period. List anything that might have triggered the headache. Triggers may include certain foods (chocolate, cheese, wine) or odors, smoke, bright light, stress, or lack of sleep. · If your doctor has prescribed medicine for your migraines, take it as directed. You may have medicine that you take only when you get a migraine and medicine that you take all the time to help prevent migraines. ? If your doctor has prescribed medicine for when you get a headache, take it at the first sign of a migraine, unless your doctor has given you other instructions. ? If your doctor has prescribed medicine to prevent migraines, take it exactly as prescribed. Call your doctor if you think you are having a problem with your medicine. · Find healthy ways to deal with stress. Migraines are most common during or right after stressful times. Take time to relax before and after you do something that has caused a migraine in the past.  · Try to keep your muscles relaxed by keeping good posture. Check your jaw, face, neck, and shoulder muscles for tension. Try to relax them. When you sit at a desk, change positions often. And make sure to stretch for 30 seconds each hour. · Get plenty of sleep and exercise. · Eat meals on a regular schedule. Avoid foods and drinks that often trigger migraines. These include chocolate, alcohol (especially red wine and port), aspartame, monosodium glutamate (MSG), and some additives found in foods (such as hot dogs, knowles, cold cuts, aged cheeses, and pickled foods). · Limit caffeine. Don't drink too much coffee, tea, or soda. But don't quit caffeine suddenly. That can also give you migraines. · Do not smoke or allow others to smoke around you. If you need help quitting, talk to your doctor about stop-smoking programs and medicines.  These can increase your chances of quitting for good.  · If you are taking birth control pills or hormone therapy, talk to your doctor about whether they are triggering your migraines. When should you call for help? Call 911 anytime you think you may need emergency care. For example, call if:    · You have signs of a stroke. These may include:  ? Sudden numbness, paralysis, or weakness in your face, arm, or leg, especially on only one side of your body. ? Sudden vision changes. ? Sudden trouble speaking. ? Sudden confusion or trouble understanding simple statements. ? Sudden problems with walking or balance. ? A sudden, severe headache that is different from past headaches.    Call your doctor now or seek immediate medical care if:    · You have new or worse nausea and vomiting.     · You have a new or higher fever.     · Your headache gets much worse.    Watch closely for changes in your health, and be sure to contact your doctor if:    · You are not getting better after 2 days (48 hours). Where can you learn more? Go to http://sly-eliazar.info/. Enter H118 in the search box to learn more about \"Migraine Headache: Care Instructions. \"  Current as of: Shaista 3, 2018  Content Version: 11.9  © 5776-3882 [employer]. Care instructions adapted under license by [employer] (which disclaims liability or warranty for this information). If you have questions about a medical condition or this instruction, always ask your healthcare professional. [place] [address] any warranty or liability for your use of this information.

## 2019-06-27 ENCOUNTER — OFFICE VISIT (OUTPATIENT)
Dept: NEUROLOGY | Age: 39
End: 2019-06-27

## 2019-06-27 VITALS
HEIGHT: 67 IN | DIASTOLIC BLOOD PRESSURE: 82 MMHG | BODY MASS INDEX: 35.16 KG/M2 | SYSTOLIC BLOOD PRESSURE: 123 MMHG | HEART RATE: 63 BPM | WEIGHT: 224 LBS | OXYGEN SATURATION: 98 % | RESPIRATION RATE: 18 BRPM

## 2019-06-27 DIAGNOSIS — R51.9 MIXED HEADACHE: Primary | ICD-10-CM

## 2019-06-27 DIAGNOSIS — E66.01 SEVERE OBESITY (HCC): ICD-10-CM

## 2019-06-27 RX ORDER — SUMATRIPTAN 100 MG/1
100 TABLET, FILM COATED ORAL
Qty: 9 TAB | Refills: 6 | Status: SHIPPED | OUTPATIENT
Start: 2019-06-27 | End: 2019-06-27

## 2019-06-27 RX ORDER — NORTRIPTYLINE HYDROCHLORIDE 10 MG/1
10 CAPSULE ORAL
Qty: 30 CAP | Refills: 3 | Status: SHIPPED | OUTPATIENT
Start: 2019-06-27 | End: 2019-08-08

## 2019-06-27 NOTE — LETTER
6/27/19 Patient: Ashlie Turcios YOB: 1980 Date of Visit: 6/27/2019 Jessica Carmona [address],  
[facility] [address] [facility] [address] VIA Facsimile: [phone] Dear Gavin Hoffmann DO, Thank you for referring Ms. Ashlie Turcios to [address] [place] [address] for evaluation. My notes for this consultation are attached. If you have questions, please do not hesitate to call me. I look forward to following your patient along with you.  
 
 
Sincerely, 
 
Krista Crocker MD

## 2019-06-27 NOTE — PATIENT INSTRUCTIONS
10 [facility]   Statement to Patients  April 1, 2014      In an effort to ensure the large volume of patient prescription refills is processed in the most efficient and expeditious manner, we are asking our patients to assist us by calling your Pharmacy for all prescription refills, this will include also your  Mail Order Pharmacy. The pharmacy will contact our office electronically to continue the refill process. Please do not wait until the last minute to call your pharmacy. We need at least 48 hours (2days) to fill prescriptions. We also encourage you to call your pharmacy before going to  your prescription to make sure it is ready. With regard to controlled substance prescription refill requests (narcotic refills) that need to be picked up at our office, we ask your cooperation by providing us with at least 72 hours (3days) notice that you will need a refill. We will not refill narcotic prescription refill requests after 4:00pm on any weekday, Monday through Thursday, or after 2:00pm on Fridays, or on the weekends. We encourage everyone to explore another way of getting your prescription refill request processed using JAYS, our patient web portal through our electronic medical record system. JAYS is an efficient and effective way to communicate your medication request directly to the office and  downloadable as an wally on your smart phone . JAYS also features a review functionality that allows you to view your medication list as well as leave messages for your physician. Are you ready to get connected? If so please review the attatched instructions or speak to any of our staff to get you set up right away! Thank you so much for your cooperation. Should you have any questions please contact our Practice Administrator.     The Physicians and Staff,  [facility] [place] [facility]  What is a living will?    A living will is a legal form you use to write down the kind of care you want at the end of your life. It is used by the health professionals who will treat you if you aren't able to decide for yourself. If you put your wishes in writing, your loved ones and others will know what kind of care you want. They won't need to guess. This can ease your mind and be helpful to others. A living will is not the same as an estate or property will. An estate will explains what you want to happen with your money and property after you die. Is a living will a legal document? A living will is a legal document. Each state has its own laws about living rosales. If you move to another state, make sure that your living will is legal in the state where you now live. Or you might use a universal form that has been approved by many states. This kind of form can sometimes be completed and stored online. Your electronic copy will then be available wherever you have a connection to the Internet. In most cases, doctors will respect your wishes even if you have a form from a different state. · You don't need an  to complete a living will. But legal advice can be helpful if your state's laws are unclear, your health history is complicated, or your family can't agree on what should be in your living will. · You can change your living will at any time. Some people find that their wishes about end-of-life care change as their health changes. · In addition to making a living will, think about completing a medical power of  form. This form lets you name the person you want to make end-of-life treatment decisions for you (your \"health care agent\") if you're not able to. Many hospitals and nursing homes will give you the forms you need to complete a living will and a medical power of . · Your living will is used only if you can't make or communicate decisions for yourself anymore.  If you become able to make decisions again, you can accept or refuse any treatment, no matter what you wrote in your living will. · Your state may offer an online registry. This is a place where you can store your living will online so the doctors and nurses who need to treat you can find it right away. What should you think about when creating a living will? Talk about your end-of-life wishes with your family members and your doctor. Let them know what you want. That way the people making decisions for you won't be surprised by your choices. Think about these questions as you make your living will:  · Do you know enough about life support methods that might be used? If not, talk to your doctor so you know what might be done if you can't breathe on your own, your heart stops, or you're unable to swallow. · What things would you still want to be able to do after you receive life-support methods? Would you want to be able to walk? To speak? To eat on your own? To live without the help of machines? · If you have a choice, where do you want to be cared for? In your home? At a hospital or nursing home? · Do you want certain Bahai practices performed if you become very ill? · If you have a choice at the end of your life, where would you prefer to die? At home? In a hospital or nursing home? Somewhere else? · Would you prefer to be buried or cremated? · Do you want your organs to be donated after you die? What should you do with your living will? · Make sure that your family members and your health care agent have copies of your living will. · Give your doctor a copy of your living will to keep in your medical record. If you have more than one doctor, make sure that each one has a copy. · You may want to put a copy of your living will where it can be easily found. Where can you learn more? Go to http://sly-eliazar.info/. Enter Q215 in the search box to learn more about \"Learning About Living Man Felt. \"  Current as of: April 18, 2018  Content Version: 11.9  © 3288-7050 [employer]. Care instructions adapted under license by [employer] (which disclaims liability or warranty for this information). If you have questions about a medical condition or this instruction, always ask your healthcare professional. [address] any warranty or liability for your use of this information. Patient history reviewed and patient examined. Several suggestions made. Will check with  to see about the snoring and if there is any breath holding as we do not want to miss obstructive sleep apnea. Will put the drug nortriptyline in place at night as a preventative drug and may help her sleep as well. Need to be patient as it builds up in the system. The drug sumatriptan will be the rescue drug which can be used at moment 0 with over-the-counter remedies if and as needed. I am glad to see the head CT was unrevealing. She is getting ready to start on an exercise routine and I think that is an excellent idea. Minimize stress and and get good sleep. Exam looks normal today. Continue to monitor headaches to increase self-awareness.

## 2019-06-27 NOTE — PROGRESS NOTES
Neurology Consult      Subjective:      Debbie Cochran is a 44 y.o. female who comes in today with the following headache history. Says she has had headaches that started developing a year or 2 ago. They have become categorically worse with time. Says there is lots of stress and her spouse has been on deployment and she has been somewhat anxious about being alone and making decisions. Says she has background depression and sleep initiation insomnia. Has been told she snores and will inquire with spouse as to whether there is associated breath holding/gasping. Says there is lots on her mind when she goes to bed. Currently gets headaches 3-4 times a month that can last days each. The onset is slow or can awaken with a and is a bifrontal and/or periorbital throbbing pain with nausea but no vomiting. It is enhanced by light and diminished with sleep when she can get it. Went to the ER recently on 5/28/2019 at [address] [place] [address] that they documented an 8+ month history of headaches exam normal afebrile vital signs stable. Head CT unrevealing CMP with slightly increased creatinine and normal CBC with differential and was given Fioricet but not really impressed with its results so far. Patient says things will be better in a year and a half when her  retires. Family history is positive for her mother with headaches. In terms of neuro deficits with or without headaches, sometimes will get binocular visual blur. Has noticed on occasions she has a slight alteration of speech syntax and some unsteadiness. Has plans to start an exercise routine. Current Outpatient Medications   Medication Sig Dispense Refill    nortriptyline (PAMELOR) 10 mg capsule Take 1 Cap by mouth nightly. 30 Cap 3    SUMAtriptan (IMITREX) 100 mg tablet Take 1 Tab by mouth once as needed for Migraine for up to 1 dose.  9 Tab 6    ondansetron (ZOFRAN ODT) 4 mg disintegrating tablet Take 1 Tab by mouth every eight (8) hours as needed for Nausea. 10 Tab 0    butalbital-acetaminophen-caff (FIORICET) -40 mg per capsule Take 1 Cap by mouth every six (6) hours as needed for Pain. 10 Cap 0      No Known Allergies  Past Medical History:   Diagnosis Date    Anxiety     Depression     Headache     Pulmonary embolism (HCC)     PE    Tinnitus       Past Surgical History:   Procedure Laterality Date    ABDOMEN SURGERY PROC UNLISTED      HX CHOLECYSTECTOMY      HX ORTHOPAEDIC Left 2012    arch/bone removal with hardware    HX ORTHOPAEDIC  2016    hardware removal      Social History     Socioeconomic History    Marital status:      Spouse name: Not on file    Number of children: Not on file    Years of education: Not on file    Highest education level: Not on file   Occupational History    Not on file   Social Needs    Financial resource strain: Not on file    Food insecurity:     Worry: Not on file     Inability: Not on file    Transportation needs:     Medical: Not on file     Non-medical: Not on file   Tobacco Use    Smoking status: Never Smoker    Smokeless tobacco: Never Used   Substance and Sexual Activity    Alcohol use:  Yes    Drug use: Not on file    Sexual activity: Not on file   Lifestyle    Physical activity:     Days per week: Not on file     Minutes per session: Not on file    Stress: Not on file   Relationships    Social connections:     Talks on phone: Not on file     Gets together: Not on file     Attends Shinto service: Not on file     Active member of club or organization: Not on file     Attends meetings of clubs or organizations: Not on file     Relationship status: Not on file    Intimate partner violence:     Fear of current or ex partner: Not on file     Emotionally abused: Not on file     Physically abused: Not on file     Forced sexual activity: Not on file   Other Topics Concern    Not on file   Social History Narrative    Not on file      Family History   Problem Relation Age of Onset    Diabetes Mother     Diabetes Maternal Aunt     Diabetes Paternal Aunt     Diabetes Maternal Grandmother     Diabetes Paternal Grandmother       Visit Vitals  /82   Pulse 63   Resp 18   Ht 5' 7\" (1.702 m)   Wt 101.6 kg (224 lb)   LMP 04/10/2019 (Approximate) Comment: irregular   SpO2 98%   BMI 35.08 kg/m²        Review of Systems:   A comprehensive review of systems was negative except for that written in the HPI. Neuro Exam:     Appearance: The patient is well developed, well nourished, provides a coherent history and is in no acute distress. Mental Status: Oriented to time, place and person. Mood and affect appropriate. Cranial Nerves:   Intact visual fields. Fundi are benign. GIOVANNY, EOM's full, no nystagmus, no ptosis. Facial sensation is normal. Corneal reflexes are intact. Facial movement is symmetric. Hearing is normal bilaterally. Palate is midline with normal sternocleidomastoid and trapezius muscles are normal. Tongue is midline. Motor:  5/5 strength in upper and lower proximal and distal muscles. Normal bulk and tone. No fasciculations. Reflexes:   Deep tendon reflexes 2+/4 and symmetrical.   Sensory:   Normal to touch, pinprick and vibration. Gait:  Normal gait. Tremor:   No tremor noted. Cerebellar:  No cerebellar signs present. Neurovascular:  Normal heart sounds and regular rhythm, peripheral pulses intact, and no carotid bruits. Assessment:   Mixed headaches. Suggest controlling stress maintaining good sleep and she is starting an exercise routine which I think will be very beneficial.  Exam looks normal.  Will put nortriptyline 10 mg in place at night which may help disperse the stress and allow her to go to sleep more naturally. Imitrex 100 mg as a rescue remedy which can be used with over-the-counter remedies for potentially a better combination effect. Continue to monitor headaches to increase self-awareness.   Will check with  on her sleep routine, don't want to miss DEBORAH. Plan:   Revisit 6 weeks.   Signed by :  Robert Skinner MD

## 2019-08-08 ENCOUNTER — TELEPHONE (OUTPATIENT)
Dept: NEUROLOGY | Age: 39
End: 2019-08-08

## 2019-08-08 ENCOUNTER — OFFICE VISIT (OUTPATIENT)
Dept: NEUROLOGY | Age: 39
End: 2019-08-08

## 2019-08-08 VITALS
HEART RATE: 76 BPM | OXYGEN SATURATION: 99 % | BODY MASS INDEX: 35.16 KG/M2 | WEIGHT: 224 LBS | DIASTOLIC BLOOD PRESSURE: 82 MMHG | HEIGHT: 67 IN | SYSTOLIC BLOOD PRESSURE: 110 MMHG

## 2019-08-08 RX ORDER — RIZATRIPTAN BENZOATE 10 MG/1
TABLET, ORALLY DISINTEGRATING ORAL
Qty: 9 TAB | Refills: 5 | Status: SHIPPED | OUTPATIENT
Start: 2019-08-08 | End: 2020-09-10

## 2019-08-08 RX ORDER — PHENTERMINE HYDROCHLORIDE 37.5 MG/1
37.5 CAPSULE ORAL DAILY
Refills: 0 | COMMUNITY
Start: 2019-07-15 | End: 2020-02-05

## 2019-08-08 RX ORDER — PREDNISONE 10 MG/1
TABLET ORAL
Qty: 42 TAB | Refills: 0 | Status: SHIPPED | OUTPATIENT
Start: 2019-08-08 | End: 2020-02-05

## 2019-08-08 NOTE — PROGRESS NOTES
Buzz Faye is a 44 y.o. female who presents with the following  Chief Complaint   Patient presents with    Headache       HPI     Patient comes in for a follow up for migraines. She is having about 4 migraines a month lasting about 3 days each. She currently has had this migraine X 3 days. Migraines are located across the forehead usually and behind the eyes. She notices she has nausea, dizziness, light sensitivity, sound sensitivity and dizziness. Has had migraines for years now. The pain is pulsating. She has a family hx of migraines with her mother. CT head normal.     Failed Pamelor, is also on other SSRIS right now and has failed. Imitrex is not working for rescue either. Can not take BP medications as she is hypotensive. Has been to ER a few times for IV infusions. 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 She has had to leave work. Has to lie down. She does have memory fog and confusion with these migraines. And day to day. Feels slow. No Known Allergies    Current Outpatient Medications   Medication Sig    phentermine 37.5 mg capsule Take 37.5 mg by mouth daily.  erenumab-aooe (AIMOVIG AUTOINJECTOR) 70 mg/mL injection 1 mL by SubCUTAneous route every thirty (30) days.  predniSONE (DELTASONE) 10 mg tablet 6 po x2 days, 5 po x 2days, 4 po x 2days, 3 po x2days, 2 po x2days, 1 po x 2days    rizatriptan (MAXALT-MLT) 10 mg disintegrating tablet 1 tab at onset of migraine; repeat 1 tab in 2 hours if HA remains; Limit: 2 tabs in 24 hours, max 3 days/ week. 30 Day Rx    ondansetron (ZOFRAN ODT) 4 mg disintegrating tablet Take 1 Tab by mouth every eight (8) hours as needed for Nausea.  butalbital-acetaminophen-caff (FIORICET) -40 mg per capsule Take 1 Cap by mouth every six (6) hours as needed for Pain. No current facility-administered medications for this visit.         Social History     Tobacco Use   Smoking Status Never Smoker Smokeless Tobacco Never Used       Past Medical History:   Diagnosis Date    Anxiety     Depression     Headache     Pulmonary embolism (HCC)     PE    Tinnitus        Past Surgical History:   Procedure Laterality Date    ABDOMEN SURGERY PROC UNLISTED      HX CHOLECYSTECTOMY      HX ORTHOPAEDIC Left 2012    arch/bone removal with hardware    HX ORTHOPAEDIC  2016    hardware removal       Family History   Problem Relation Age of Onset    Diabetes Mother     Diabetes Maternal Aunt     Diabetes Paternal Aunt     Diabetes Maternal Grandmother     Diabetes Paternal Grandmother        Social History     Socioeconomic History    Marital status:      Spouse name: Not on file    Number of children: Not on file    Years of education: Not on file    Highest education level: Not on file   Tobacco Use    Smoking status: Never Smoker    Smokeless tobacco: Never Used   Substance and Sexual Activity    Alcohol use: Yes       Review of Systems   Eyes: Positive for blurred vision, double vision and photophobia. Respiratory: Negative for shortness of breath and wheezing. Cardiovascular: Negative for chest pain and palpitations. Gastrointestinal: Positive for nausea. Negative for vomiting. Neurological: Positive for dizziness and headaches. Negative for tingling, tremors, seizures and loss of consciousness. Psychiatric/Behavioral: Positive for memory loss. Remainder of comprehensive review is negative. Physical Exam :    Visit Vitals  /82   Pulse 76   Ht 5' 7\" (1.702 m)   Wt 101.6 kg (224 lb)   SpO2 99%   BMI 35.08 kg/m²       General: Well defined, nourished, and groomed individual in no acute distress.    Neck: Supple, nontender, no bruits, no pain with resistance to active range of motion.    Heart: Regular rate and rhythm, no murmurs, rub, or gallop. Normal S1S2.   Lungs: Clear to auscultation bilaterally with equal chest expansion, no cough, no wheeze  Musculoskeletal: Extremities revealed no edema and had full range of motion of joints.    Psych: Good mood and bright affect    NEUROLOGICAL EXAMINATION:    Mental Status: Alert and oriented to person, place, and time    Cranial Nerves:    II, III, IV, VI: Visual acuity grossly intact. Visual fields are normal.    Pupils are equal, round, and reactive to light and accommodation.    Extra-ocular movements are full and fluid. Fundoscopic exam was benign, no ptosis or nystagmus.    V-XII: Hearing is grossly intact. Facial features are symmetric, with normal sensation and strength. The palate rises symmetrically and the tongue protrudes midline. Sternocleidomastoids 5/5. Motor Examination: Normal tone, bulk, and strength, 5/5 muscle strength throughout. Coordination: Finger to nose was normal. No resting or intention tremor    Gait and Station: Steady while walking. Normal arm swing. No pronator drift. No muscle wasting or fasiculations noted. Reflexes: DTRs 2+ throughout. Results for orders placed or performed during the hospital encounter of 05/28/19   CBC WITH AUTOMATED DIFF   Result Value Ref Range    WBC 8.2 3.6 - 11.0 K/uL    RBC 4.90 3.80 - 5.20 M/uL    HGB 13.9 11.5 - 16.0 g/dL    HCT 44.1 35.0 - 47.0 %    MCV 90.0 80.0 - 99.0 FL    MCH 28.4 26.0 - 34.0 PG    MCHC 31.5 30.0 - 36.5 g/dL    RDW 12.4 11.5 - 14.5 %    PLATELET 345 926 - 797 K/uL    MPV 11.2 8.9 - 12.9 FL    NRBC 0.0 0  WBC    ABSOLUTE NRBC 0.00 0.00 - 0.01 K/uL    NEUTROPHILS 50 32 - 75 %    LYMPHOCYTES 37 12 - 49 %    MONOCYTES 8 5 - 13 %    EOSINOPHILS 3 0 - 7 %    BASOPHILS 1 0 - 1 %    IMMATURE GRANULOCYTES 1 (H) 0.0 - 0.5 %    ABS. NEUTROPHILS 4.1 1.8 - 8.0 K/UL    ABS. LYMPHOCYTES 3.1 0.8 - 3.5 K/UL    ABS. MONOCYTES 0.6 0.0 - 1.0 K/UL    ABS. EOSINOPHILS 0.3 0.0 - 0.4 K/UL    ABS. BASOPHILS 0.1 0.0 - 0.1 K/UL    ABS. IMM.  GRANS. 0.1 (H) 0.00 - 0.04 K/UL    DF AUTOMATED     METABOLIC PANEL, BASIC   Result Value Ref Range    Sodium 138 136 - 145 mmol/L    Potassium 3.7 3.5 - 5.1 mmol/L    Chloride 107 97 - 108 mmol/L    CO2 28 21 - 32 mmol/L    Anion gap 3 (L) 5 - 15 mmol/L    Glucose 95 65 - 100 mg/dL    BUN 10 6 - 20 MG/DL    Creatinine 1.10 (H) 0.55 - 1.02 MG/DL    BUN/Creatinine ratio 9 (L) 12 - 20      GFR est AA >60 >60 ml/min/1.73m2    GFR est non-AA 56 (L) >60 ml/min/1.73m2    Calcium 8.9 8.5 - 10.1 MG/DL   SAMPLES BEING HELD   Result Value Ref Range    SAMPLES BEING HELD [email or web address]     COMMENT        Add-on orders for these samples will be processed based on acceptable specimen integrity and analyte stability, which may vary by analyte. HCG URINE, QL. - POC   Result Value Ref Range    Pregnancy test,urine (POC) NEGATIVE  NEG         Orders Placed This Encounter    phentermine 37.5 mg capsule     Sig: Take 37.5 mg by mouth daily. Refill:  0    erenumab-aooe (AIMOVIG AUTOINJECTOR) 70 mg/mL injection     Si mL by SubCUTAneous route every thirty (30) days. Dispense:  1 Each     Refill:  5    predniSONE (DELTASONE) 10 mg tablet     Si po x2 days, 5 po x 2days, 4 po x 2days, 3 po x2days, 2 po x2days, 1 po x 2days     Dispense:  42 Tab     Refill:  0    rizatriptan (MAXALT-MLT) 10 mg disintegrating tablet     Si tab at onset of migraine; repeat 1 tab in 2 hours if HA remains; Limit: 2 tabs in 24 hours, max 3 days/ week. 30 Day Rx     Dispense:  9 Tab     Refill:  5       1. Chronic migraine      Chronic migraines. Failed multiple SSRI and AED. Unable to take BP medications due to her hypotensive state and this is contraindicated. Initiate Aimovig 70 mg every 30 days to help as FDA approved migraine prevention medication. Demo in office with no concerns. Consider Botox if this does not work. Prednisone taper to break current headache cycle. Also try Maxalt MLT for PRN rescue as imitrex, fioricet have failed with with nausea this can help as easier to take. Continue to track and look for triggers. Call with any changes.                This note will not be viewable in [employer]

## 2020-02-05 ENCOUNTER — OFFICE VISIT (OUTPATIENT)
Dept: NEUROLOGY | Age: 40
End: 2020-02-05

## 2020-02-05 VITALS
HEIGHT: 67 IN | WEIGHT: 216 LBS | SYSTOLIC BLOOD PRESSURE: 110 MMHG | DIASTOLIC BLOOD PRESSURE: 80 MMHG | OXYGEN SATURATION: 98 % | HEART RATE: 78 BPM | BODY MASS INDEX: 33.9 KG/M2

## 2020-02-05 DIAGNOSIS — H53.9 VISUAL CHANGES: ICD-10-CM

## 2020-02-05 RX ORDER — PROMETHAZINE HYDROCHLORIDE 25 MG/1
25 TABLET ORAL
Qty: 30 TAB | Refills: 5 | Status: SHIPPED | OUTPATIENT
Start: 2020-02-05 | End: 2020-09-10

## 2020-02-05 RX ORDER — ZOLMITRIPTAN 5 MG/1
SPRAY NASAL
Qty: 6 CONTAINER | Refills: 5 | Status: SHIPPED | OUTPATIENT
Start: 2020-02-05 | End: 2020-09-10

## 2020-02-05 RX ORDER — ZOLMITRIPTAN 5 MG/1
SPRAY NASAL
Qty: 2 CONTAINER | Refills: 0 | Status: SHIPPED | COMMUNITY
Start: 2020-02-05 | End: 2020-09-10

## 2020-02-05 RX ORDER — BUPROPION HYDROCHLORIDE 150 MG/1
TABLET, EXTENDED RELEASE ORAL
COMMUNITY
Start: 2020-01-27 | End: 2020-09-10

## 2020-02-05 NOTE — PROGRESS NOTES
Pt is here today c/o bad headaches daily for the past month to the point were she had to quit her job.

## 2020-02-05 NOTE — PROGRESS NOTES
Keiko Palmer is a [age] [facility] y.o. female who presents with the following  Chief Complaint   Patient presents with    Follow-up    Headache       HPI    Patient comes in for a follow up for migraines. She is having chronic, 30 days of migraines a month.      Migraines are located across the forehead usually and behind the eyes. She notices she has nausea, dizziness, light sensitivity, sound sensitivity and dizziness. Has had migraines for years now. The pain is pulsating. She has a family hx of migraines with her mother. CT head normal.  But never had an MRI brain yet.      Failed Pamelor, is also on other SSRIS right now and has failed. Failed AED. Imitrex is not working for rescue either. Failed imitrex nasal spray also. Can not take BP medications as she is hypotensive. Has been to ER a few times for IV infusions. Had to quit work due to the extreme pain and inablity to function. She has had to leave work. Has to lie down. She does have memory fog and confusion with these migraines. And day to day. Feels slow.                        No Known Allergies    Current Outpatient Medications   Medication Sig    buPROPion SR (WELLBUTRIN SR) 150 mg SR tablet     erenumab-aooe (AIMOVIG AUTOINJECTOR) 70 mg/mL injection 1 mL by SubCUTAneous route every thirty (30) days.  ZOLMitriptan (ZOMIG) 5 mg nasal solution 1 spray in 1 nostril at onset of migraine; may repeat in 2 hours x 1. Limit: 2 sprays in 24 hours, not more than 3 days a week. 30 day Rx    erenumab-aooe (AIMOVIG AUTOINJECTOR) 70 mg/mL injection 1 mL by SubCUTAneous route once for 1 dose.  ZOLMitriptan (ZOMIG) 5 mg nasal solution 1 spray in 1 nostril at onset of migraine; may repeat in 2 hours x 1. Limit: 2 sprays in 24 hours, not more than 3 days a week.  30 day Rx    rizatriptan (MAXALT-MLT) 10 mg disintegrating tablet 1 tab at onset of migraine; repeat 1 tab in 2 hours if HA remains; Limit: 2 tabs in 24 hours, max 3 days/ week.  30 Day Rx    butalbital-acetaminophen-caff (FIORICET) -40 mg per capsule Take 1 Cap by mouth every six (6) hours as needed for Pain.  promethazine (PHENERGAN) 25 mg tablet Take 1 Tab by mouth every six (6) hours as needed for Nausea. No current facility-administered medications for this visit. Social History     Tobacco Use   Smoking Status Never Smoker   Smokeless Tobacco Never Used       Past Medical History:   Diagnosis Date    Anxiety     Depression     Headache     Pulmonary embolism (HCC)     PE    Tinnitus        Past Surgical History:   Procedure Laterality Date    ABDOMEN SURGERY PROC UNLISTED      HX CHOLECYSTECTOMY      HX ORTHOPAEDIC Left 2012    arch/bone removal with hardware    HX ORTHOPAEDIC  2016    hardware removal       Family History   Problem Relation Age of Onset    Diabetes Mother     Diabetes Maternal Aunt     Diabetes Paternal Aunt     Diabetes Maternal Grandmother     Diabetes Paternal Grandmother        Social History     Socioeconomic History    Marital status:      Spouse name: Not on file    Number of children: Not on file    Years of education: Not on file    Highest education level: Not on file   Tobacco Use    Smoking status: Never Smoker    Smokeless tobacco: Never Used   Substance and Sexual Activity    Alcohol use: Yes       Review of Systems   Eyes: Positive for blurred vision and photophobia. Negative for double vision. Respiratory: Negative for shortness of breath and wheezing. Gastrointestinal: Positive for nausea. Negative for vomiting. Neurological: Positive for dizziness and headaches. Negative for seizures and loss of consciousness. Psychiatric/Behavioral: Positive for memory loss. Remainder of comprehensive review is negative.      Physical Exam :    Visit Vitals  /80   Pulse 78   Ht 5' 7\" (1.702 m)   Wt 98 kg (216 lb)   SpO2 98%   BMI 33.83 kg/m²       General: Well defined, nourished, and groomed individual in no acute distress.    Neck: Supple, nontender, no bruits, no pain with resistance to active range of motion.    Heart: Regular rate and rhythm, no murmurs, rub, or gallop. Normal S1S2. Lungs: Clear to auscultation bilaterally with equal chest expansion, no cough, no wheeze  Musculoskeletal: Extremities revealed no edema and had full range of motion of joints.    Psych: Good mood and bright affect    NEUROLOGICAL EXAMINATION:    Mental Status: Alert and oriented to person, place, and time    Cranial Nerves:    II, III, IV, VI: Visual acuity grossly intact. Visual fields are normal.    Pupils are equal, round, and reactive to light and accommodation.    Extra-ocular movements are full and fluid. Fundoscopic exam was benign, no ptosis or nystagmus.    V-XII: Hearing is grossly intact. Facial features are symmetric, with normal sensation and strength. The palate rises symmetrically and the tongue protrudes midline. Sternocleidomastoids 5/5. Motor Examination: Normal tone, bulk, and strength, 5/5 muscle strength throughout. Coordination: Finger to nose was normal. No resting or intention tremor    Gait and Station: Steady while walking. Normal arm swing. No pronator drift. No muscle wasting or fasiculations noted. Reflexes: DTRs 2+ throughout.             Results for orders placed or performed during the hospital encounter of 05/28/19   CBC WITH AUTOMATED DIFF   Result Value Ref Range    WBC 8.2 3.6 - 11.0 K/uL    RBC 4.90 3.80 - 5.20 M/uL    HGB 13.9 11.5 - 16.0 g/dL    HCT 44.1 35.0 - 47.0 %    MCV 90.0 80.0 - 99.0 FL    MCH 28.4 26.0 - 34.0 PG    MCHC 31.5 30.0 - 36.5 g/dL    RDW 12.4 11.5 - 14.5 %    PLATELET 828 926 - 808 K/uL    MPV 11.2 8.9 - 12.9 FL    NRBC 0.0 0  WBC    ABSOLUTE NRBC 0.00 0.00 - 0.01 K/uL    NEUTROPHILS 50 32 - 75 %    LYMPHOCYTES 37 12 - 49 %    MONOCYTES 8 5 - 13 %    EOSINOPHILS 3 0 - 7 %    BASOPHILS 1 0 - 1 %    IMMATURE GRANULOCYTES 1 (H) 0.0 - 0.5 %    ABS. NEUTROPHILS 4.1 1.8 - 8.0 K/UL    ABS. LYMPHOCYTES 3.1 0.8 - 3.5 K/UL    ABS. MONOCYTES 0.6 0.0 - 1.0 K/UL    ABS. EOSINOPHILS 0.3 0.0 - 0.4 K/UL    ABS. BASOPHILS 0.1 0.0 - 0.1 K/UL    ABS. IMM. GRANS. 0.1 (H) 0.00 - 0.04 K/UL    DF AUTOMATED     METABOLIC PANEL, BASIC   Result Value Ref Range    Sodium 138 136 - 145 mmol/L    Potassium 3.7 3.5 - 5.1 mmol/L    Chloride 107 97 - 108 mmol/L    CO2 28 21 - 32 mmol/L    Anion gap 3 (L) 5 - 15 mmol/L    Glucose 95 65 - 100 mg/dL    BUN 10 6 - 20 MG/DL    Creatinine 1.10 (H) 0.55 - 1.02 MG/DL    BUN/Creatinine ratio 9 (L) 12 - 20      GFR est AA >60 >60 ml/min/1.73m2    GFR est non-AA 56 (L) >60 ml/min/1.73m2    Calcium 8.9 8.5 - 10.1 MG/DL   SAMPLES BEING HELD   Result Value Ref Range    SAMPLES BEING HELD [email or web address]     COMMENT        Add-on orders for these samples will be processed based on acceptable specimen integrity and analyte stability, which may vary by analyte. HCG URINE, QL. - POC   Result Value Ref Range    Pregnancy test,urine (POC) NEGATIVE  NEG         Orders Placed This Encounter    MRI BRAIN W WO CONT     Standing Status:   Future     Standing Expiration Date:   3/5/2021     Order Specific Question:   Is Patient Pregnant? Answer:   No     Order Specific Question:   STAT Creatinine as indicated     Answer: Yes    SLEEP MEDICINE REFERRAL     Referral Priority:   Routine     Referral Type:   Consultation     Referral Reason:   Specialty Services Required     Referred to Provider:   Elizabeth Alegre MD     Number of Visits Requested:   1    DUPLEX CAROTID BILATERAL     Standing Status:   Future     Number of Occurrences:   1     Standing Expiration Date:   2020     Order Specific Question:   Is Patient Pregnant? Answer:   No    buPROPion SR (WELLBUTRIN SR) 150 mg SR tablet    erenumab-aooe (AIMOVIG AUTOINJECTOR) 70 mg/mL injection     Si mL by SubCUTAneous route every thirty (30) days.      Dispense:  1 Each Refill:  5    ZOLMitriptan (ZOMIG) 5 mg nasal solution     Si spray in 1 nostril at onset of migraine; may repeat in 2 hours x 1. Limit: 2 sprays in 24 hours, not more than 3 days a week. 30 day Rx     Dispense:  6 Container     Refill:  5    erenumab-aooe (AIMOVIG AUTOINJECTOR) 70 mg/mL injection     Si mL by SubCUTAneous route once for 1 dose. Dispense:  1 Each     Refill:  0    ZOLMitriptan (ZOMIG) 5 mg nasal solution     Si spray in 1 nostril at onset of migraine; may repeat in 2 hours x 1. Limit: 2 sprays in 24 hours, not more than 3 days a week. 30 day Rx     Dispense:  2 Container     Refill:  0       1. Chronic migraine    2. Visual changes      Chronic migraines. disucssed Botox vs. Aimovig. Initiate Aimovig 70 mg every 30 days for FDA indicated and approved migraine prevention. Discussed this in full. Give a sample and get a PA   Failed SSRI, AED and can not take BP medications. So bad they have inhibited work and social life. Try Zomig nasal spray for PRN rescue of migraines as imitrex tablets and nasal spray have not worked for her. FU after. MRI brain to evaluate for sources including stroke, tumor, lesions, masses.            This note will not be viewable in [employer]

## 2020-02-17 ENCOUNTER — HOSPITAL ENCOUNTER (EMERGENCY)
Age: 40
Discharge: HOME | End: 2020-02-17
Attending: EMERGENCY MEDICINE
Payer: OTHER GOVERNMENT

## 2020-02-17 VITALS
DIASTOLIC BLOOD PRESSURE: 78 MMHG | HEART RATE: 67 BPM | HEIGHT: 66 IN | BODY MASS INDEX: 34.72 KG/M2 | TEMPERATURE: 98.4 F | RESPIRATION RATE: 18 BRPM | WEIGHT: 216 LBS | SYSTOLIC BLOOD PRESSURE: 129 MMHG | OXYGEN SATURATION: 98 %

## 2020-02-17 DIAGNOSIS — A08.4 VIRAL GASTROENTERITIS: Primary | ICD-10-CM

## 2020-02-17 LAB
ALBUMIN SERPL-MCNC: 3.9 G/DL (ref 3.5–5)
ALBUMIN/GLOB SERPL: 0.8 {RATIO} (ref 1.1–2.2)
ALP SERPL-CCNC: 65 U/L (ref 45–117)
ALT SERPL-CCNC: 42 U/L (ref 12–78)
ANION GAP SERPL CALC-SCNC: 7 MMOL/L (ref 5–15)
APPEARANCE UR: CLEAR
AST SERPL-CCNC: 26 U/L (ref 15–37)
ATRIAL RATE: 71 BPM
BASOPHILS # BLD: 0 K/UL (ref 0–0.1)
BASOPHILS NFR BLD: 0 % (ref 0–1)
BILIRUB SERPL-MCNC: 0.5 MG/DL (ref 0.2–1)
BILIRUB UR QL: NEGATIVE
BUN SERPL-MCNC: 10 MG/DL (ref 6–20)
BUN/CREAT SERPL: 9 (ref 12–20)
CALCIUM SERPL-MCNC: 8.8 MG/DL (ref 8.5–10.1)
CALCULATED P AXIS, ECG09: 33 DEGREES
CALCULATED R AXIS, ECG10: 57 DEGREES
CALCULATED T AXIS, ECG11: 43 DEGREES
CHLORIDE SERPL-SCNC: 106 MMOL/L (ref 97–108)
CO2 SERPL-SCNC: 26 MMOL/L (ref 21–32)
COLOR UR: NORMAL
COMMENT, HOLDF: NORMAL
CREAT SERPL-MCNC: 1.09 MG/DL (ref 0.55–1.02)
DIAGNOSIS, 93000: NORMAL
DIFFERENTIAL METHOD BLD: NORMAL
EOSINOPHIL # BLD: 0.2 K/UL (ref 0–0.4)
EOSINOPHIL NFR BLD: 2 % (ref 0–7)
ERYTHROCYTE [DISTWIDTH] IN BLOOD BY AUTOMATED COUNT: 12.1 % (ref 11.5–14.5)
GLOBULIN SER CALC-MCNC: 4.9 G/DL (ref 2–4)
GLUCOSE SERPL-MCNC: 89 MG/DL (ref 65–100)
GLUCOSE UR STRIP.AUTO-MCNC: NEGATIVE MG/DL
HCG UR QL: NEGATIVE
HCT VFR BLD AUTO: 44.7 % (ref 35–47)
HGB BLD-MCNC: 14.4 G/DL (ref 11.5–16)
HGB UR QL STRIP: NEGATIVE
IMM GRANULOCYTES # BLD AUTO: 0 K/UL (ref 0–0.04)
IMM GRANULOCYTES NFR BLD AUTO: 0 % (ref 0–0.5)
KETONES UR QL STRIP.AUTO: NEGATIVE MG/DL
LEUKOCYTE ESTERASE UR QL STRIP.AUTO: NEGATIVE
LYMPHOCYTES # BLD: 2.1 K/UL (ref 0.8–3.5)
LYMPHOCYTES NFR BLD: 20 % (ref 12–49)
MCH RBC QN AUTO: 29 PG (ref 26–34)
MCHC RBC AUTO-ENTMCNC: 32.2 G/DL (ref 30–36.5)
MCV RBC AUTO: 89.9 FL (ref 80–99)
MONOCYTES # BLD: 0.7 K/UL (ref 0–1)
MONOCYTES NFR BLD: 6 % (ref 5–13)
NEUTS SEG # BLD: 7.5 K/UL (ref 1.8–8)
NEUTS SEG NFR BLD: 72 % (ref 32–75)
NITRITE UR QL STRIP.AUTO: NEGATIVE
NRBC # BLD: 0 K/UL (ref 0–0.01)
NRBC BLD-RTO: 0 PER 100 WBC
P-R INTERVAL, ECG05: 168 MS
PH UR STRIP: 5.5 [PH] (ref 5–8)
PLATELET # BLD AUTO: 234 K/UL (ref 150–400)
PMV BLD AUTO: 11.4 FL (ref 8.9–12.9)
POTASSIUM SERPL-SCNC: 3.7 MMOL/L (ref 3.5–5.1)
PROT SERPL-MCNC: 8.8 G/DL (ref 6.4–8.2)
PROT UR STRIP-MCNC: NEGATIVE MG/DL
Q-T INTERVAL, ECG07: 380 MS
QRS DURATION, ECG06: 80 MS
QTC CALCULATION (BEZET), ECG08: 412 MS
RBC # BLD AUTO: 4.97 M/UL (ref 3.8–5.2)
SAMPLES BEING HELD,HOLD: NORMAL
SODIUM SERPL-SCNC: 139 MMOL/L (ref 136–145)
SP GR UR REFRACTOMETRY: 1.02 (ref 1–1.03)
UROBILINOGEN UR QL STRIP.AUTO: 0.2 EU/DL (ref 0.2–1)
VENTRICULAR RATE, ECG03: 71 BPM
WBC # BLD AUTO: 10.5 K/UL (ref 3.6–11)

## 2020-02-17 PROCEDURE — 96374 THER/PROPH/DIAG INJ IV PUSH: CPT

## 2020-02-17 PROCEDURE — 85025 COMPLETE CBC W/AUTO DIFF WBC: CPT

## 2020-02-17 PROCEDURE — 80053 COMPREHEN METABOLIC PANEL: CPT

## 2020-02-17 PROCEDURE — 99284 EMERGENCY DEPT VISIT MOD MDM: CPT

## 2020-02-17 PROCEDURE — 81025 URINE PREGNANCY TEST: CPT

## 2020-02-17 PROCEDURE — 81003 URINALYSIS AUTO W/O SCOPE: CPT

## 2020-02-17 PROCEDURE — 74011250637 HC RX REV CODE- 250/637: Performed by: STUDENT IN AN ORGANIZED HEALTH CARE EDUCATION/TRAINING PROGRAM

## 2020-02-17 PROCEDURE — 74011250636 HC RX REV CODE- 250/636: Performed by: STUDENT IN AN ORGANIZED HEALTH CARE EDUCATION/TRAINING PROGRAM

## 2020-02-17 PROCEDURE — 96361 HYDRATE IV INFUSION ADD-ON: CPT

## 2020-02-17 PROCEDURE — 93005 ELECTROCARDIOGRAM TRACING: CPT

## 2020-02-17 RX ORDER — ONDANSETRON 4 MG/1
4 TABLET, ORALLY DISINTEGRATING ORAL
Qty: 30 TAB | Refills: 0 | Status: SHIPPED | OUTPATIENT
Start: 2020-02-17 | End: 2020-09-10

## 2020-02-17 RX ORDER — ACETAMINOPHEN 325 MG/1
650 TABLET ORAL
Status: COMPLETED | OUTPATIENT
Start: 2020-02-17 | End: 2020-02-17

## 2020-02-17 RX ORDER — PROCHLORPERAZINE EDISYLATE 5 MG/ML
10 INJECTION INTRAMUSCULAR; INTRAVENOUS ONCE
Status: COMPLETED | OUTPATIENT
Start: 2020-02-17 | End: 2020-02-17

## 2020-02-17 RX ORDER — ONDANSETRON 2 MG/ML
4 INJECTION INTRAMUSCULAR; INTRAVENOUS ONCE
Status: DISCONTINUED | OUTPATIENT
Start: 2020-02-17 | End: 2020-02-17

## 2020-02-17 RX ADMIN — PROCHLORPERAZINE EDISYLATE 10 MG: 5 INJECTION INTRAMUSCULAR; INTRAVENOUS at 13:49

## 2020-02-17 RX ADMIN — ACETAMINOPHEN 650 MG: 325 TABLET ORAL at 15:02

## 2020-02-17 RX ADMIN — SODIUM CHLORIDE 1000 ML: 900 INJECTION, SOLUTION INTRAVENOUS at 13:49

## 2020-02-17 NOTE — ED PROVIDER NOTES
Pt is 37yro F with Pmhx of PE in 2017, migraines, s/p cholecystectomy in 2005, and severe obesity who presents to ED with complaint of abdominal pain, vomiting, and diarrhea. Per her report, she went to a seafood restaurant 3 days ago to celebrate Garcia's day. \"Afterward, I felt weird but thought maybe I just ate too much,\" she declares. Patient reports that the next morning, she developed mid-abdominal pain, associated with NBNB emesis and watery diarrhea x ~8x/day. She was able to keep down her dinner last night and last BM was ~ 30 min ago in ED(still watery). No known sick contact. Also, no recent travel or Abx use. Denies fever, chills, congestion, chest pain, palpitations, or dysuria. Past Medical History:   Diagnosis Date    Anxiety     Depression     Headache     Pulmonary embolism (HCC)     PE    Tinnitus        Past Surgical History:   Procedure Laterality Date    ABDOMEN SURGERY PROC UNLISTED      HX CHOLECYSTECTOMY      HX ORTHOPAEDIC Left 2012    arch/bone removal with hardware    HX ORTHOPAEDIC  2016    hardware removal         Family History:   Problem Relation Age of Onset    Diabetes Mother     Diabetes Maternal Aunt     Diabetes Paternal Aunt     Diabetes Maternal Grandmother     Diabetes Paternal Grandmother        Social History     Socioeconomic History    Marital status:      Spouse name: Not on file    Number of children: Not on file    Years of education: Not on file    Highest education level: Not on file   Occupational History    Not on file   Social Needs    Financial resource strain: Not on file    Food insecurity:     Worry: Not on file     Inability: Not on file    Transportation needs:     Medical: Not on file     Non-medical: Not on file   Tobacco Use    Smoking status: Never Smoker    Smokeless tobacco: Never Used   Substance and Sexual Activity    Alcohol use:  Yes    Drug use: Not on file    Sexual activity: Not on file   Lifestyle  Physical activity:     Days per week: Not on file     Minutes per session: Not on file    Stress: Not on file   Relationships    Social connections:     Talks on phone: Not on file     Gets together: Not on file     Attends [employer] service: Not on file     Active member of club or organization: Not on file     Attends meetings of clubs or organizations: Not on file     Relationship status: Not on file    Intimate partner violence:     Fear of current or ex partner: Not on file     Emotionally abused: Not on file     Physically abused: Not on file     Forced sexual activity: Not on file   Other Topics Concern    Not on file   Social History Narrative    Not on file         ALLERGIES: Patient has no known allergies. Review of Systems   Constitutional: Negative for fever. HENT: Negative for congestion and rhinorrhea. Eyes: Negative for redness. Respiratory: Negative for cough. Cardiovascular: Negative for chest pain. Gastrointestinal: Positive for abdominal pain, diarrhea, nausea and vomiting. Negative for blood in stool. Endocrine: Negative for cold intolerance. Genitourinary: Negative for dysuria. Musculoskeletal: Negative for myalgias. Skin: Negative for rash. Neurological: Negative for dizziness and headaches. Psychiatric/Behavioral: Negative for agitation and confusion. Vitals:    02/17/20 1317 02/17/20 1326   BP: 98/61    Pulse: 67    Resp: 18    Temp: 98.4 °F (36.9 °C)    SpO2: 100% 100%   Weight: 98 kg (216 lb)    Height: 5' 6\" (1.676 m)             Physical Exam  Constitutional:       General: She is not in acute distress. Appearance: She is not toxic-appearing. HENT:      Head: Normocephalic and atraumatic. Mouth/Throat:      Comments: Dry mucosa  Eyes:      Extraocular Movements: Extraocular movements intact. Pupils: Pupils are equal, round, and reactive to light. Cardiovascular:      Rate and Rhythm: Normal rate and regular rhythm.       Heart sounds: Normal heart sounds. No murmur. Pulmonary:      Effort: Pulmonary effort is normal. No respiratory distress. Breath sounds: Normal breath sounds. No wheezing. Abdominal:      General: A surgical scar is present. Bowel sounds are increased. There is no distension. Palpations: Abdomen is soft. Tenderness: There is abdominal tenderness in the periumbilical area. There is no guarding or rebound. Negative signs include Elam's sign. Skin:     General: Skin is warm. Capillary Refill: Capillary refill takes less than 2 seconds. Neurological:      General: No focal deficit present. Mental Status: She is alert and oriented to person, place, and time. Psychiatric:         Mood and Affect: Mood normal.          MDM  Number of Diagnoses or Management Options  Diagnosis management comments: Pt is 37yro F who presents to Ed for management of abdominal pain and diarrhea. Ddx include viral gastroenteritis, food poisoning, pregnancy, UTI, colitis. Will obtain CBC, CMP, UA, UPT, ekg. Will start 1L NS bolus and compazine for nausea. Will f/u on labwork and re-assess. Amount and/or Complexity of Data Reviewed  Clinical lab tests: ordered  Tests in the medicine section of CPT®: ordered         Procedures  None      2:47 PM  Patient reports feeling much better after NS bolus and compazine. She believes she will be able to give urine sample soon. Will f/u on UA and UPT.    3:36 PM  All labwork unremarkable, including UA. Pregnancy test negative. Pt is feeling better. Will discharge home now with Zofran prn and supportive care at home. Instructed her to f/u with PCP in a couple of days. ED precautions given as well. She expressed understanding of plan.         Mark Weir MD

## 2020-02-17 NOTE — ED TRIAGE NOTES
Pt arrives with c/o abdominal pain , diarrhea, and vomiting x 3 days. Pt reports eating seafood Friday night.  States she woke up sat with v/d.

## 2020-04-06 ENCOUNTER — VIRTUAL VISIT (OUTPATIENT)
Dept: NEUROLOGY | Age: 40
End: 2020-04-06

## 2020-04-06 VITALS — BODY MASS INDEX: 34.86 KG/M2 | HEIGHT: 66 IN

## 2020-04-06 DIAGNOSIS — R41.3 MEMORY LOSS: Primary | ICD-10-CM

## 2020-04-06 DIAGNOSIS — G43.919 INTRACTABLE MIGRAINE WITHOUT STATUS MIGRAINOSUS, UNSPECIFIED MIGRAINE TYPE: ICD-10-CM

## 2020-04-06 RX ORDER — BUSPIRONE HYDROCHLORIDE 10 MG/1
TABLET ORAL
COMMUNITY
Start: 2020-03-18 | End: 2020-09-10

## 2020-04-06 NOTE — PROGRESS NOTES
Consent: Angel Gonzalez, who was seen by synchronous (real-time) audio-video technology, and/or her healthcare decision maker, is aware that this patient-initiated, Telehealth encounter on 4/6/2020 is a billable service, with coverage as determined by her insurance carrier. She is aware that she may receive a bill and has provided verbal consent to proceed: Yes. Patient comes in for a follow up for migraines on virtual visit.;   [facility].   she will come in and get it done. Mother gets botox and was a bad experience. She is having chronic, 30 days of migraines a month.      Migraines are located across the forehead usually and behind the eyes. She notices she has nausea, dizziness, light sensitivity, sound sensitivity and dizziness. Has had migraines for years now. The pain is pulsating. She has a family hx of migraines with her mother. MRI was cancelled and pushed back. Has not heard from sleep medicine either        Failed Pamelor, is also on other SSRIS right now and has failed. Failed AED. Imitrex is not working for rescue either. Failed imitrex nasal spray also. Can not take BP medications as she is hypotensive. Has been to ER a few times for IV infusions.      Had to quit work due to the extreme pain and inablity to function. She has had to leave work. Has to lie down. She does have memory fog and confusion with these migraines. And day to day. Feels slow.   Is still really worried about this. Wants to ch maximino this out. Assessment & Plan:   Diagnoses and all orders for this visit:    1. Memory loss  -     REFERRAL TO NEUROPSYCHOLOGY    2.  Intractable migraine without status migrainosus, unspecified migraine type                I spent at least 25 minutes with this established patient, and >50% of the time was spent counseling and/or coordinating care regarding medications, treatment, memory, testing 382  Subjective:   Caleb Dorado is a 44 y.o. female who was seen for No chief complaint on file. Prior to Admission medications    Medication Sig Start Date End Date Taking? Authorizing Provider   busPIRone (BUSPAR) 10 mg tablet  3/18/20  Yes Provider, Historical   ondansetron (ZOFRAN ODT) 4 mg disintegrating tablet Take 1 Tab by mouth every eight (8) hours as needed for Nausea or Vomiting. 2/17/20  Yes Donte Sharif MD   ZOLMitriptan (ZOMIG) 5 mg nasal solution 1 spray in 1 nostril at onset of migraine; may repeat in 2 hours x 1. Limit: 2 sprays in 24 hours, not more than 3 days a week. 30 day Rx 2/5/20  Yes Yolande Luna NP   promethazine (PHENERGAN) 25 mg tablet Take 1 Tab by mouth every six (6) hours as needed for Nausea. 2/5/20  Yes Yolande Luna NP   ZOLMitriptan (ZOMIG) 5 mg nasal solution 1 spray in 1 nostril at onset of migraine; may repeat in 2 hours x 1. Limit: 2 sprays in 24 hours, not more than 3 days a week. 30 day Rx 2/5/20  Yes Yolande Luna NP   rizatriptan (MAXALT-MLT) 10 mg disintegrating tablet 1 tab at onset of migraine; repeat 1 tab in 2 hours if HA remains; Limit: 2 tabs in 24 hours, max 3 days/ week. 30 Day Rx 8/8/19  Yes Yolande Luna NP   buPROPion SR [facility] SR) 150 mg SR tablet  1/27/20   Provider, Historical   erenumab-aooe (AIMOVIG AUTOINJECTOR) 70 mg/mL injection 1 mL by SubCUTAneous route every thirty (30) days. 2/5/20   Yolande Luna NP   butalbital-acetaminophen-caff (FIORICET) -40 mg per capsule Take 1 Cap by mouth every six (6) hours as needed for Pain.  5/28/19   Yousif Brower MD     No Known Allergies    Patient Active Problem List   Diagnosis Code    Pulmonary emboli (HCC) I26.99    SIRS (systemic inflammatory response syndrome) (HCC) R65.10    Cellulitis L03.90    Obesity E66.9    Severe obesity ([facility] [address] E66.01     Patient Active Problem List    Diagnosis Date Noted    Severe obesity ([facility] [address] 06/27/2019    Pulmonary emboli (HCC) 12/18/2017    SIRS (systemic inflammatory response syndrome) (HCC) 12/18/2017    Cellulitis 12/18/2017    Obesity 12/18/2017     Current Outpatient Medications   Medication Sig Dispense Refill    busPIRone (BUSPAR) 10 mg tablet       ondansetron (ZOFRAN ODT) 4 mg disintegrating tablet Take 1 Tab by mouth every eight (8) hours as needed for Nausea or Vomiting. 30 Tab 0    ZOLMitriptan (ZOMIG) 5 mg nasal solution 1 spray in 1 nostril at onset of migraine; may repeat in 2 hours x 1. Limit: 2 sprays in 24 hours, not more than 3 days a week. 30 day Rx 6 Container 5    promethazine (PHENERGAN) 25 mg tablet Take 1 Tab by mouth every six (6) hours as needed for Nausea. 30 Tab 5    ZOLMitriptan (ZOMIG) 5 mg nasal solution 1 spray in 1 nostril at onset of migraine; may repeat in 2 hours x 1. Limit: 2 sprays in 24 hours, not more than 3 days a week. 30 day Rx 2 Container 0    rizatriptan (MAXALT-MLT) 10 mg disintegrating tablet 1 tab at onset of migraine; repeat 1 tab in 2 hours if HA remains; Limit: 2 tabs in 24 hours, max 3 days/ week. 30 Day Rx 9 Tab 5    buPROPion SR (WELLBUTRIN SR) 150 mg SR tablet       erenumab-aooe (AIMOVIG AUTOINJECTOR) 70 mg/mL injection 1 mL by SubCUTAneous route every thirty (30) days. 1 Each 5    butalbital-acetaminophen-caff (FIORICET) -40 mg per capsule Take 1 Cap by mouth every six (6) hours as needed for Pain.  10 Cap 0     No Known Allergies  Past Medical History:   Diagnosis Date    Anxiety     Depression     Headache     Pulmonary embolism (HCC)     PE    Tinnitus      Past Surgical History:   Procedure Laterality Date    ABDOMEN SURGERY PROC UNLISTED      HX CHOLECYSTECTOMY      HX ORTHOPAEDIC Left 2012    arch/bone removal with hardware    HX ORTHOPAEDIC  2016    hardware removal     Family History   Problem Relation Age of Onset    Diabetes Mother     Diabetes Maternal Aunt     Diabetes Paternal Aunt     Diabetes Maternal Grandmother  Diabetes Paternal Grandmother      Social History     Tobacco Use    Smoking status: Never Smoker    Smokeless tobacco: Never Used   Substance Use Topics    Alcohol use: Yes       Review of Systems   Eyes: Positive for blurred vision, double vision and photophobia. Respiratory: Negative for shortness of breath and wheezing. Cardiovascular: Negative for chest pain and palpitations. Gastrointestinal: Positive for nausea. Negative for vomiting. Musculoskeletal: Negative for falls. Neurological: Positive for dizziness, tingling and headaches. Negative for tremors, sensory change, speech change, seizures and loss of consciousness. Psychiatric/Behavioral: Positive for memory loss. The patient is not nervous/anxious and does not have insomnia.             Objective:   Vital Signs: (As obtained by patient/caregiver at home)  Visit Vitals  Ht 5' 6\" (1.676 m)   BMI 34.86 kg/m²        [INSTRUCTIONS:  \"[x]\" Indicates a positive item  \"[]\" Indicates a negative item  -- DELETE ALL ITEMS NOT EXAMINED]    Constitutional: [x] Appears well-developed and well-nourished [x] No apparent distress      [] Abnormal -     Mental status: [x] Alert and awake  [x] Oriented to person/place/time [x] Able to follow commands    [] Abnormal -     Eyes:   EOM    [x]  Normal    [] Abnormal -   Sclera  [x]  Normal    [] Abnormal -          Discharge [x]  None visible   [] Abnormal -     HENT: [x] Normocephalic, atraumatic  [] Abnormal -   [x] Mouth/Throat: Mucous membranes are moist    External Ears [x] Normal  [] Abnormal -    Neck: [x] No visualized mass [] Abnormal -     Pulmonary/Chest: [x] Respiratory effort normal   [x] No visualized signs of difficulty breathing or respiratory distress        [] Abnormal -      Musculoskeletal:   [x] Normal gait with no signs of ataxia         [x] Normal range of motion of neck        [] Abnormal -     Neurological:        [x] No Facial Asymmetry (Cranial nerve 7 motor function) (limited exam due to video visit)          [x] No gaze palsy        [] Abnormal -          Skin:        [x] No significant exanthematous lesions or discoloration noted on facial skin         [] Abnormal -            Psychiatric:       [x] Normal Affect [] Abnormal -        [x] No Hallucinations    Other pertinent observable physical exam findings:-      Will set her up to come in for injection and training of AImovig. Discussed Botox again and she does not want this. Will set up and inject. Keep Zomig nasal as this is working really well for her for a PRN rescue. Using Excedrin also PRN if needed. Refer to DR. Gisella Smith to evaluate the memory better as this is really worrying her and she still havign day to day functioning issues. Did have to quit work   FU after or after 3 injections. We discussed the expected course, resolution and complications of the diagnosis(es) in detail. Medication risks, benefits, costs, interactions, and alternatives were discussed as indicated. I advised her to contact the office if her condition worsens, changes or fails to improve as anticipated. She expressed understanding with the diagnosis(es) and plan. Kaylyn Humphreys is a 44 y.o. female being evaluated by a video visit encounter for concerns as above. A caregiver was present when appropriate. Due to this being a TeleHealth encounter (During [facility] public health emergency), evaluation of the following organ systems was limited: Vitals/Constitutional/EENT/Resp/CV/GI//MS/Neuro/Skin/Heme-Lymph-Imm. Pursuant to the emergency declaration under the [facility], [facility]5 waiver authority and the [employer] and [employer] Act, this Virtual  Visit was conducted, with patient's (and/or legal guardian's) consent, to reduce the patient's risk of exposure to COVID-19 and provide necessary medical care.      Services were provided through a video synchronous discussion virtually to substitute for in-person clinic visit. Patient and provider were located at their individual homes.         Javier Contreras NP

## 2020-04-06 NOTE — PROGRESS NOTES
Pt states her migraines have not been good. Pt states she is having them twice a week. Haven't tried the Aimovig because she is afraid ti inject herself.

## 2020-04-07 ENCOUNTER — CLINICAL SUPPORT (OUTPATIENT)
Dept: NEUROLOGY | Age: 40
End: 2020-04-07

## 2020-04-07 VITALS — TEMPERATURE: 98.2 F

## 2020-04-07 DIAGNOSIS — G43.919 INTRACTABLE MIGRAINE WITHOUT STATUS MIGRAINOSUS, UNSPECIFIED MIGRAINE TYPE: Primary | ICD-10-CM

## 2020-04-07 NOTE — PROGRESS NOTES
Patient came to office today to be shown how to give herself her Aimovig injection. She brought her medication with her. I showed her using our demo injector. She chose to give hers in the R arm sub q. Patient demonstrated with our demo, and then gave herself the Aimovig injection. She stated she felt fine and left the office. Advised her to call the office if she had any questions or concerns when she is due next month.

## 2020-04-10 ENCOUNTER — OFFICE VISIT (OUTPATIENT)
Dept: NEUROLOGY | Age: 40
End: 2020-04-10

## 2020-04-10 DIAGNOSIS — G31.84 MILD COGNITIVE IMPAIRMENT: Primary | ICD-10-CM

## 2020-04-10 DIAGNOSIS — F41.9 ANXIETY AND DEPRESSION: ICD-10-CM

## 2020-04-10 DIAGNOSIS — F32.A ANXIETY AND DEPRESSION: ICD-10-CM

## 2020-04-10 DIAGNOSIS — R47.89 WORD FINDING DIFFICULTY: ICD-10-CM

## 2020-04-10 DIAGNOSIS — R51.9 MIXED HEADACHE: ICD-10-CM

## 2020-04-10 DIAGNOSIS — R41.3 SHORT-TERM MEMORY LOSS: ICD-10-CM

## 2020-04-10 DIAGNOSIS — E66.01 SEVERE OBESITY (HCC): ICD-10-CM

## 2020-04-10 NOTE — PROGRESS NOTES
[address] [facility],[address]  [address]   [place] [address] [place] [address] [address]   [phone] Office   [phone] Fax      Neuropsychology    Initial Diagnostic Interview Note      Referral:  Sangeeta Nicholson DO, Luis Felipe Ritchie, SOO    Jaelyn Barry is a 44 y.o. right handed  female who was unaccompanied to the initial clinical interview on 4/10/20 . Please refer to her medical records for details pertaining to her history. At the start of the appointment, I reviewed the [facility] Chart (including Media scanned in from previous providers) for the active Problem List, all pertinent Past Medical Hx, medications, recent radiologic and laboratory findings. In addition, I reviewed pt's documented Immunization Record and Encounter History. Pursuant to the emergency declaration under the [facility], [facility]5 waiver authority and the [employer] and [employer] Act, this Virtual  Visit was conducted, with appropriate consent obtained, to reduce the patient's risk of exposure to COVID-19 and provide continuity of care   Services were provided in this manner to substitute for in-person clinic visit. The originating site is the patient's home and the distance site is [facility] the [facility]. These types of teleneuropsychology/telehealth/telemedicine visits were authorized by the President of the [place], though I/we cannot guarantee what a third party payor will do reimbursement/coverage wise. I indicated that I would evaluate the patient and recommend diagnostics and treatment based on my assessment and impressions, and that our sessions are not being recorded and that personal health information is protected to the best of our abilities.         She completed college without history of previously diagnosed LD and/or receipt of special education services. She is not currently working. She has  having chronic, 30 days of migraines a month.    Migraines are located across the forehead usually and behind the eyes. She notices she has nausea, dizziness, light sensitivity, sound sensitivity and dizziness. Has had migraines for years now. The pain is pulsating. She has a family hx of migraines with her mother. MRI was cancelled and pushed back. Has not heard from sleep medicine either  Had to quit work because of these issues and marked memory problems. She is very worried about her memory. She can't remember details. Starts tasks and does not complete. Loses words. Loses train of thought. She may always have had some mild attention problems but this is different and getting worse. She has quite a bit of physical pain and doesn't feel like she is able to function. She had to leave work due to this. She has to go and lie down. She was on medication for mood and now is on medication for anxiety. She has to lie down. She has memory problems, and describes her brain as feeling foggy. She feels slow. She is very worried. She drives without issue. Her spouse has to remind her to take her medications and helps with the finances. Day-to-day chores are also with reminders and independent for her ADLs. No couseling or psychiatrist. No changes in sense of taste or smell. She has variable sleep, better than before. Sleep consult pushed back, as has MRI. I do see a CT from 2019 which I copied below.      EXAM: CT HEAD WO CONT     INDICATION: persistent HA's     COMPARISON: None.     CONTRAST: None.     TECHNIQUE: Unenhanced CT of the head was performed using 5 mm images. Brain and  bone windows were generated.   CT dose reduction was achieved through use of a  standardized protocol tailored for this examination and automatic exposure  control for dose modulation.       FINDINGS:  The ventricles and sulci are normal in size, shape and configuration and  midline. There is no significant white matter disease. There is no intracranial  hemorrhage, extra-axial collection, mass, mass effect or midline shift. The  basilar cisterns are open. No acute infarct is identified. The bone windows  demonstrate no abnormalities. The visualized portions of the paranasal sinuses  and mastoid air cells are clear.     IMPRESSION  IMPRESSION: No acute abnormality. MRI has been pushed back due to coronavirus       Neuropsychological Mental Status Exam (NMSE):      Historian: Good  Praxis: No UE apraxia  R/L Orientation: Intact to self and to other  Dress: within normal limits   Weight: Overweight  Appearance/Hygiene: within normal limits   Gait: within normal limits   Assistive Devices: Glasses  Mood: within normal limits   Affect: within normal limits   Comprehension: within normal limits   Thought Process: within normal limits   Expressive Language: within normal limits   Receptive Language: within normal limits   Motor:  No cognitive or motor perseveration  ETOH: Denied  Tobacco: Denied  Illicit: Denied  SI/HI: Denied  Psychosis: Denied  Insight: Within normal limits  Judgment: Within normal limits  Other Psych:      Past Medical History:   Diagnosis Date    Anxiety     Depression     Headache     Pulmonary embolism (HCC)     PE    Tinnitus        Past Surgical History:   Procedure Laterality Date    ABDOMEN SURGERY PROC UNLISTED      HX CHOLECYSTECTOMY      HX ORTHOPAEDIC Left 2012    arch/bone removal with hardware    HX ORTHOPAEDIC  2016    hardware removal       No Known Allergies    Family History   Problem Relation Age of Onset    Diabetes Mother     Diabetes Maternal Aunt     Diabetes Paternal Aunt     Diabetes Maternal Grandmother     Diabetes Paternal Grandmother        Social History     Tobacco Use    Smoking status: Never Smoker    Smokeless tobacco: Never Used   Substance Use Topics    Alcohol use:  Yes  Drug use: Not on file       Current Outpatient Medications   Medication Sig Dispense Refill    busPIRone (BUSPAR) 10 mg tablet       ondansetron (ZOFRAN ODT) 4 mg disintegrating tablet Take 1 Tab by mouth every eight (8) hours as needed for Nausea or Vomiting. 30 Tab 0    buPROPion SR (WELLBUTRIN SR) 150 mg SR tablet       erenumab-aooe (AIMOVIG AUTOINJECTOR) 70 mg/mL injection 1 mL by SubCUTAneous route every thirty (30) days. 1 Each 5    ZOLMitriptan (ZOMIG) 5 mg nasal solution 1 spray in 1 nostril at onset of migraine; may repeat in 2 hours x 1. Limit: 2 sprays in 24 hours, not more than 3 days a week. 30 day Rx 6 Container 5    promethazine (PHENERGAN) 25 mg tablet Take 1 Tab by mouth every six (6) hours as needed for Nausea. 30 Tab 5    ZOLMitriptan (ZOMIG) 5 mg nasal solution 1 spray in 1 nostril at onset of migraine; may repeat in 2 hours x 1. Limit: 2 sprays in 24 hours, not more than 3 days a week. 30 day Rx 2 Container 0    rizatriptan (MAXALT-MLT) 10 mg disintegrating tablet 1 tab at onset of migraine; repeat 1 tab in 2 hours if HA remains; Limit: 2 tabs in 24 hours, max 3 days/ week. 30 Day Rx 9 Tab 5    butalbital-acetaminophen-caff (FIORICET) -40 mg per capsule Take 1 Cap by mouth every six (6) hours as needed for Pain. 10 Cap 0         Plan:  Obtain authorization for testing from [employer]. Report to follow once testing, scoring, and interpretation completed. ? Organic based neurocognitive issues versus mood disorder or combination of same. ? Problems organic, functional, or both? This note will not be viewable in [address]

## 2020-05-05 ENCOUNTER — OFFICE VISIT (OUTPATIENT)
Dept: NEUROLOGY | Age: 40
End: 2020-05-05

## 2020-05-05 VITALS — TEMPERATURE: 98.3 F

## 2020-05-05 DIAGNOSIS — R51.9 MIXED HEADACHE: ICD-10-CM

## 2020-05-05 DIAGNOSIS — F43.10 PTSD (POST-TRAUMATIC STRESS DISORDER): ICD-10-CM

## 2020-05-05 DIAGNOSIS — F32.2 SEVERE MAJOR DEPRESSION (HCC): ICD-10-CM

## 2020-05-05 DIAGNOSIS — F41.8 ANXIETY WITH SOMATIC FEATURES: ICD-10-CM

## 2020-05-05 DIAGNOSIS — G31.84 MILD COGNITIVE IMPAIRMENT: Primary | ICD-10-CM

## 2020-05-05 NOTE — LETTER
5/6/20 Patient: Iris Senior YOB: 1980 Date of Visit: 5/5/2020 Kristy Beltran Mathew [address], DO 
[address] [facility] [address] VIA Facsimile: [phone] Dear Epifanio Reddy DO, Thank you for referring Ms. Iris Senior to [facility] for evaluation. My notes for this consultation are attached. If you have questions, please do not hesitate to call me. I look forward to following your patient along with you. Sincerely, Kris Burt PsyD

## 2020-05-06 NOTE — PROGRESS NOTES
[facility],5Th Floor  [address]   [address] [place] [address] [place] [address] [address]   [phone] Office   [phone] Fax      Neuropsychological Evaluation Report    Referral:  Nica Belcher DO, Go Fay, NP    Eunice Freeman is a 44 y.o. right handed  female who was unaccompanied to the initial clinical interview on 4/10/20 . Please refer to her medical records for details pertaining to her history. At the start of the appointment, I reviewed the patient's [facility] Epic Chart (including Media scanned in from previous providers) for the active Problem List, all pertinent Past Medical Hx, medications, recent radiologic and laboratory findings. In addition, I reviewed pt's documented Immunization Record and Encounter History. Pursuant to the emergency declaration under the [facility], [facility] waiver authority and the [employer] Act, this Virtual  Visit was conducted, with appropriate consent obtained, to reduce the patient's risk of exposure to COVID-19 and provide continuity of care   Services were provided in this manner to substitute for in-person clinic visit. The originating site is the patient's home and the distance site is [facility] at the [facility]. These types of teleneuropsychology/telehealth/telemedicine visits were authorized by the President of the [place], though I/we cannot guarantee what a third party payor will do reimbursement/coverage wise. I indicated that I would evaluate the patient and recommend diagnostics and treatment based on my assessment and impressions, and that our sessions are not being recorded and that personal health information is protected to the best of our abilities.         She completed college without history of previously diagnosed LD and/or receipt of special education services. She is not currently working. She has  having chronic, 30 days of migraines a month.    Migraines are located across the forehead usually and behind the eyes. She notices she has nausea, dizziness, light sensitivity, sound sensitivity and dizziness. Has had migraines for years now. The pain is pulsating. She has a family hx of migraines with her mother. MRI was cancelled and pushed back. Has not heard from sleep medicine either  Had to quit work because of these issues and marked memory problems. She is very worried about her memory. She can't remember details. Starts tasks and does not complete. Loses words. Loses train of thought. She may always have had some mild attention problems but this is different and getting worse. She has quite a bit of physical pain and doesn't feel like she is able to function. She had to leave work due to this. She has to go and lie down. She was on medication for mood and now is on medication for anxiety. She has to lie down. She has memory problems, and describes her brain as feeling foggy. She feels slow. She is very worried. She drives without issue. Her spouse has to remind her to take her medications and helps with the finances. Day-to-day chores are also with reminders and independent for her ADLs. No couseling or psychiatrist. No changes in sense of taste or smell. She has variable sleep, better than before. Sleep consult pushed back, as has MRI. I do see a CT from 2019 which I copied below.      EXAM: CT HEAD WO CONT     INDICATION: persistent HA's     COMPARISON: None.     CONTRAST: None.     TECHNIQUE: Unenhanced CT of the head was performed using 5 mm images. Brain and  bone windows were generated.   CT dose reduction was achieved through use of a  standardized protocol tailored for this examination and automatic exposure  control for dose modulation.       FINDINGS:  The ventricles and sulci are normal in size, shape and configuration and  midline. There is no significant white matter disease. There is no intracranial  hemorrhage, extra-axial collection, mass, mass effect or midline shift. The  basilar cisterns are open. No acute infarct is identified. The bone windows  demonstrate no abnormalities. The visualized portions of the paranasal sinuses  and mastoid air cells are clear.     IMPRESSION  IMPRESSION: No acute abnormality. MRI has been pushed back due to coronavirus       Neuropsychological Mental Status Exam (NMSE):      Historian: Good  Praxis: No UE apraxia  R/L Orientation: Intact to self and to other  Dress: within normal limits   Weight: Overweight  Appearance/Hygiene: within normal limits   Gait: within normal limits   Assistive Devices: Glasses  Mood: within normal limits   Affect: within normal limits   Comprehension: within normal limits   Thought Process: within normal limits   Expressive Language: within normal limits   Receptive Language: within normal limits   Motor:  No cognitive or motor perseveration  ETOH: Denied  Tobacco: Denied  Illicit: Denied  SI/HI: Denied  Psychosis: Denied  Insight: Within normal limits  Judgment: Within normal limits  Other Psych:      Past Medical History:   Diagnosis Date    Anxiety     Depression     Headache     Pulmonary embolism (HCC)     PE    Tinnitus        Past Surgical History:   Procedure Laterality Date    ABDOMEN SURGERY PROC UNLISTED      HX CHOLECYSTECTOMY      HX ORTHOPAEDIC Left 2012    arch/bone removal with hardware    HX ORTHOPAEDIC  2016    hardware removal       No Known Allergies    Family History   Problem Relation Age of Onset    Diabetes Mother     Diabetes Maternal Aunt     Diabetes Paternal Aunt     Diabetes Maternal Grandmother     Diabetes Paternal Grandmother        Social History     Tobacco Use    Smoking status: Never Smoker    Smokeless tobacco: Never Used   Substance Use Topics    Alcohol use:  Yes    Drug use: Not on file       Current Outpatient Medications   Medication Sig Dispense Refill    busPIRone (BUSPAR) 10 mg tablet       ondansetron (ZOFRAN ODT) 4 mg disintegrating tablet Take 1 Tab by mouth every eight (8) hours as needed for Nausea or Vomiting. 30 Tab 0    buPROPion SR (WELLBUTRIN SR) 150 mg SR tablet       erenumab-aooe (AIMOVIG AUTOINJECTOR) 70 mg/mL injection 1 mL by SubCUTAneous route every thirty (30) days. 1 Each 5    ZOLMitriptan (ZOMIG) 5 mg nasal solution 1 spray in 1 nostril at onset of migraine; may repeat in 2 hours x 1. Limit: 2 sprays in 24 hours, not more than 3 days a week. 30 day Rx 6 Container 5    promethazine (PHENERGAN) 25 mg tablet Take 1 Tab by mouth every six (6) hours as needed for Nausea. 30 Tab 5    ZOLMitriptan (ZOMIG) 5 mg nasal solution 1 spray in 1 nostril at onset of migraine; may repeat in 2 hours x 1. Limit: 2 sprays in 24 hours, not more than 3 days a week. 30 day Rx 2 Container 0    rizatriptan (MAXALT-MLT) 10 mg disintegrating tablet 1 tab at onset of migraine; repeat 1 tab in 2 hours if HA remains; Limit: 2 tabs in 24 hours, max 3 days/ week. 30 Day Rx 9 Tab 5    butalbital-acetaminophen-caff (FIORICET) -40 mg per capsule Take 1 Cap by mouth every six (6) hours as needed for Pain. 10 Cap 0         Plan:  Obtain authorization for testing from [employer]. Report to follow once testing, scoring, and interpretation completed. ? Organic based neurocognitive issues versus mood disorder or combination of same. ? Problems organic, functional, or both? This note will not be viewable in [address] Neuropsychological Evaluation  Patient Testing 5/5/20 Report Completed 5/6/20  A Psychometrist Assisted w/ portions of this evaluation while under my direct supervision    Please refer to the patient's initial interview progress note (copied above) and her medical records for details pertaining to her history.   Today's neuropsychological test scores follow: The following assessment procedures were performed:      Neuropsychologist Performed, Interpreted, & Reported: Neuropsychological Mental Status Exam, Revised Memory & Behavior Checklist, Mini Mental State Exam, Clock Drawing Test, Test Of Premorbid Functioning, Esme-Melzack Pain Questionnaire,  History Taking  & Clinical Interview With The Patient,  TRESA, CPT-III, Review Of Available Records. Psychometrist Administered & Neuropsychologist Interpreted & Neuropsychologist Reported: Finger Tapping Test, Grooved Pegboard Test, Speech-Sounds Perception Test, LOS,  Wechsler Adult Intelligence Scale  IV, Verbal Fluency Tests, Leroy & Leroy  Revised, Trailmaking Test Parts A & B, [place] Verbal Learning Test  3, Edwin Complex Figure Test, Pillai Depression Inventory  II, Pillai Anxiety Inventory,. Test Findings:  Note:  The patients raw data have been compared with currently available norms which include demographic corrections for age, gender, and/or education. Sometimes, the patients scores are compared to demographically similar individuals as close to the patients age, education level, etc., as possible. \"Average\" is viewed as being +/- 1 standard deviation (SD) from the stated mean for a particular test score. \"Low average\" is viewed as being between 1 and 2 SD below the mean, and above average is viewed as being 1 and 2 SD above the mean. Scores falling in the borderline range (between 1-1/2 and 2 SD below the mean) are viewed with particular attention as to whether they are normal or abnormal neurocognitive test scores. Other methods of inference in analyzing the test data are also utilized, including the pattern and range of scores in the profile, bilateral motor functions, and the presence, if any, of pathognomonic signs. Behaviorally, the patient was friendly and cooperative and appeared motivated to perform well during this examination.   Within this context, the results of this evaluation are viewed as a valid reflection of the patients actual neurocognitive and emotional status. Her structured word list fluency, as assessed by the FAS Test, was within the mildly impaired range with a T score of 36. Category fluency was within the mildly to moderately impaired range with a T score of 32. Confrontation naming ability, as assessed by the Leroy & Leroy  Revised, was within the moderately impaired range at 46/60 correct (T = 27). This pattern of performance is indicative of a patient who is at increased risk for day-to-day problems with verbal fluency and confrontation naming ability. The patient was administered the [facility] Continuous Performance Test  III, a computer-administered test of sustained attention, and review of the subscales within this instrument did not reveal clinically significant concerns for inattentiveness or impulsivity. Verbal auditory attention and discrimination, as assessed by the Speech-Sounds Perception Test (T = 47) was within the average range. Nonverbal auditory attention and discrimination, as assessed by the LOS, was also normal.   This pattern of performance is not  indicative of a patient who is at increased risk for day-to-day problems with sustained visual attention/concentration. Verbal and nonverbal auditory attention and discrimination related abilities were also normal.       The patient was administered the Wechsler Adult Intelligence Scale  IV. See Appendix I for full breakdown of IQ test scores (scanned into media section of this EMR). As can be seen, there was no clinically significant difference between her average range Working Memory Index score of 95 (37th  %ile) and her average range Processing Speed Index score of 97 (42nd %ile). Her Verbal Comprehension Index score of 96 (39th %ile) was within the average range. Her Perceptual Reasoning Index score of 84 (14th %ile) was within the low average range. This pattern of performance is not indicative of a patient who is at increased risk for day-to-day problems with working memory and/or processing speed. Perceptual reasoning, while normal, is somewhat lower than expected based on her performance on a task estimating premorbid functioning levels. The patient was administered the [place] Verbal Learning Test  - 3 and generated a normal range and positive learning curve over five repeated auditory word list learning trials. An interference trial was within normal limits. Free and cued, short and long delayed recall were within or above the normal range. Recognition and forced choice recall were within normal limits. This pattern of performance is not indicative of a patient who is at increased risk for day-to-day problems with auditory learning and/or memory. The patients performance on the copy portion of the Edwin Complex Figure Test was within normal limits  (>16th %ile). Recall for the complex design was within the normal range after both short and long delays. Recognition recall was normal.  This pattern of performance is not indicative of a patient who is at increased risk for day-to-day problems with visual organization and visual delayed memory. Simple timed visual motor sequencing (Trailmaking Test Part A) was within the average range with a T score of 45. Her performance on a similar, but more complex task of timed visual motor sequencing (Trailmaking Test Part B) was within the average range with a T score of 54. She made zero sequencing errors on this latter test.  aken together, this pattern of performance is not indicative of a patient who is at increased risk for day-to-day problems with executive functioning. Motor speed for finger tapping was within the mildly impaired range for her dominant hand (T = 39) and average for her nondominant hand (%= 45).  This potentially reflects a lateralized issue for which neurologic correlation is advised. The patient rated her current level of pain as \"2/5- Discomforting\" on the Esme-Melzack Pain Questionnaire. She reported pain in her head and abdomen. Jorge L Melvin Her Pillai Depression Inventory II score of 45 was within the severely depressed range. Her Pillai Anxiety Inventory score of 30 reflected severe anxiety. The patient was also administered the Personality Assessment Inventory and generated a valid profile for interpretation. Within this context, there are numerous clinical scale elevations. Marked depression and anxiety are present. Her level of anxiety is severe to the degree that her ability to concentrate and to attend are significantly compromised. Her self-concept is harsh and generally negative. There is strong support for PTSD. There is strong support for borderline personality traits. There is a psychological component to her physical pain concerns. She can be domineering and overcontrolling in her relationships. Anger management issues are also present. She reports suicidal thoughts on this measure and denied active plan or intent with me. Her level of treatment motivation is somewhat low, despite her recognition that a number of areas of her life are not going well at this time. The nature of some of these problems suggests that treatment may be difficult, with a lengthier process and a higher probability of reversals. Impressions & Recommendations: This patient generated a mixed normal/abnormal range Neuropsychological Evaluation with respect to neurocognitive functioning. In this regard, impairments are noted for verbal fluency, confrontation naming, and dominant handed motor skills. These all point potentially to a lateralized, left hemisphere/focal issue for which neurologic correlation is strongly advised. CT is normal but I don't see where/if she has had a MRI Brain.   Otherwise, her performance across all other neurocognitive domains assessed, including mental status, auditory learning and memory, visual organization, visual memory, visual attention, verbal auditory attention, nonverbal auditory attention, working memory, processing sped, perceptual reasoning , verbal comprehension, nondominant handed motor dexterity, and executive functioning are reassuringly within the normal range. From an emotional standpoint, there is severe depression with currently passive suicidal ideation, severe anxiety with somatic features, PTSD, and borderline personality traits. Her anxiety is severe to the degree whereby her ability to attend and to concentrate is significantly compromised. Neurologic correlation is indicated with respect to three different tests pointing to a possible focal, left hemisphere issue. Otherwise, though (and thankfully), her organic based attentional capacity and memory capacities are normal, and there is psychiatric overlay here. I suggest a review of her current psychiatric medication management for marked depression and anxiety. I also recommend consideration for [address] and EMDR. She should be actively engaged in psychotherapy, and DBT is a treatment option as well. Hopefully, the potential focal findings are benign and, with improvement in psychiatric functioning, should come significant improvement in day-to-day neurocognitive and physical functioning, including a marked reduction in the severity, intensity, and frequency of her migraines. Not concerned about competency, driving, day-to-day supervision, etc.  Stay active mentally, physically, and socially. Baseline now established. Follow up prn. Clinical correlation is, of course, indicated. I will discuss these findings with the patient when she follows up with me in the near future. A follow up Neuropsychological Evaluation is indicated on a prn basis, especially if there are any cognitive and/or emotional changes.       DIAGNOSES:   Selected Cognitive Deficits (Neurologic correlation indicated, see above)       Major Depression - Severe       Anxiety Disorder - Severe, With Somatic Features      PTSD      Passive SI      Borderline Personality Traits (versus combination of the above)           The above information is based upon information currently available to me. If there is any additional information of which I am currently unaware, I would be more than happy to review it upon having it made available to me. Thank you for the opportunity to see this interesting individual.     Sincerely,       Crystal Velarde. Yari Pfeiffer, [facility], EdS    CC: Waldemar Cao, SOO    Time Documentation:      94936 x1 &  86854 x 1 Neuropsych testing/data gathering by Neuropsychologist (60 minutes)     [phone] x 1  96139 x 7 Test Administration/Data Gathering By Technician: (4 hours). 05556 x 1 (first 30 minutes), 66457 x 7 (each additional 30 minutes)    96132 x 1  96133 x 1 Testing Evaluation Services by Neuropsychologist (1 hour, 50 minutes) 96132 x 1 (first hour), 96133 x 1 (50 minutes)    Definitions:      49859/53504:  Neurobehavioral Status Exam, Clinical interview. Clinical assessment of thinking, reasoning and judgment, by neuropsychologist, both face to face time with patient and time interpreting those test results and reporting, first and subsequent hours)    17532/10685: Neuropsychological Test Administration by Technician/Psychometrist, first 30 minutes and each additional 30 minutes. The above includes: Record review. Review of history provided by patient. Review of collaborative information. Testing by Clinician. Review of raw data. Scoring. Report writing of individual tests administered by Clinician.   Integration of individual tests administered by psychometrist with NSE/testing by clinician, review of records/history/collaborative information, case Conceptualization, treatment planning, clinical decision making, report writing, coordination Of Care. Psychometry test codes as time spent by psychometrist administering and scoring neurocognitive/psychological tests under supervision of neuropsychologist.  Integral services including scoring of raw data, data interpretation, case conceptualization, report writing etcetera were initiated after the patient finished testing/raw data collected and was completed on the date the report was signed.

## 2020-05-27 ENCOUNTER — VIRTUAL VISIT (OUTPATIENT)
Dept: NEUROLOGY | Age: 40
End: 2020-05-27

## 2020-05-27 DIAGNOSIS — F41.8 ANXIETY WITH SOMATIC FEATURES: ICD-10-CM

## 2020-05-27 DIAGNOSIS — R41.89 COGNITIVE CHANGES: ICD-10-CM

## 2020-05-27 DIAGNOSIS — G31.84 MILD COGNITIVE IMPAIRMENT: Primary | ICD-10-CM

## 2020-05-27 DIAGNOSIS — R47.89 WORD FINDING DIFFICULTY: ICD-10-CM

## 2020-05-27 DIAGNOSIS — R41.3 MEMORY LOSS: Primary | ICD-10-CM

## 2020-05-27 DIAGNOSIS — F32.2 SEVERE MAJOR DEPRESSION (HCC): ICD-10-CM

## 2020-05-27 DIAGNOSIS — R51.9 MIXED HEADACHE: ICD-10-CM

## 2020-05-27 DIAGNOSIS — F43.10 PTSD (POST-TRAUMATIC STRESS DISORDER): ICD-10-CM

## 2020-05-27 DIAGNOSIS — R41.3 SHORT-TERM MEMORY LOSS: ICD-10-CM

## 2020-05-27 NOTE — PROGRESS NOTES
This is a teleneuropsychology (audio/visual) visit that was performed with in the originating site at patient's home and the distance site at [facility] outpatient clinic at [place]. Verbal consent to participate in the video visit was obtained. This visit occurred during the corona (COVID -19) public health emergency and these visits were authorized by the President of the [place]. I discussed with the patient the nature of our teleneuropsych visit in that :    - I would evaluate the patient and recommend diagnostics and treatment based on my assessment and impressions, and/or provided test results and discussed these issues with the patient and/or family.    - Our sessions are not being recorded and that personal health information is protected    - Our team will provide follow-up care in person if when the patient needs it. Prior to seeing the patient I reviewed the records, including the previously completed report, the records in [place], and any updated visits from other providers since I saw the patient last.      Today, I engaged in a psychoeducational and supportive psychotherapy and feedback session with the patient via teleneuropsychology. I reviewed the results of the recent Neuropsychological Evaluation, including discussing individual tests as well as patient's areas of neurocognitive strength versus weakness. We discussed, in detail, the following: This patient generated a mixed normal/abnormal range Neuropsychological Evaluation with respect to neurocognitive functioning. In this regard, impairments are noted for verbal fluency, confrontation naming, and dominant handed motor skills. These all point potentially to a lateralized, left hemisphere/focal issue for which neurologic correlation is strongly advised. CT is normal but I don't see where/if she has had a MRI Brain.   Otherwise, her performance across all other neurocognitive domains assessed, including mental status, auditory learning and memory, visual organization, visual memory, visual attention, verbal auditory attention, nonverbal auditory attention, working memory, processing sped, perceptual reasoning , verbal comprehension, nondominant handed motor dexterity, and executive functioning are reassuringly within the normal range. From an emotional standpoint, there is severe depression with currently passive suicidal ideation, severe anxiety with somatic features, PTSD, and borderline personality traits. Her anxiety is severe to the degree whereby her ability to attend and to concentrate is significantly compromised.                 Neurologic correlation is indicated with respect to three different tests pointing to a possible focal, left hemisphere issue. Otherwise, though (and thankfully), her organic based attentional capacity and memory capacities are normal, and there is psychiatric overlay here. I suggest a review of her current psychiatric medication management for marked depression and anxiety. I also recommend consideration for [address] [place] and EMDR. She should be actively engaged in psychotherapy, and DBT is a treatment option as well. Hopefully, the potential focal findings are benign and, with improvement in psychiatric functioning, should come significant improvement in day-to-day neurocognitive and physical functioning, including a marked reduction in the severity, intensity, and frequency of her migraines. Not concerned about competency, driving, day-to-day supervision, etc.  Stay active mentally, physically, and socially. Baseline now established. Follow up prn. Clinical correlation is, of course, indicated.                 I will discuss these findings with the patient when she follows up with me in the near future.   A follow up Neuropsychological Evaluation is indicated on a prn basis, especially if there are any cognitive and/or emotional changes.       DIAGNOSES:                         Selected Cognitive Deficits (Neurologic correlation indicated, see above)                                                   Major Depression - Severe                                                   Anxiety Disorder - Severe, With Somatic Features                                                  PTSD                                                  Passive SI                                                  Borderline Personality Traits (versus combination of the above)      Education was provided regarding my diagnostic impressions, and we discussed treatment plan/options. I also answered numerous questions related to the clinical findings, including discussing various methods to improve cognition and mood. Counseling provided regarding mood and cognition. CBT and supportive psychotherapy techniques were utilized. Supportive/Cognitive Behavioral/Solution Focused psychotherapy provided  Discussed rational versus irrational thinking patterns and their consequences. Discussed healthy/adaptive and unhealthy/maladaptive coping. The patient needs to follow with psychiatry, psychology. Strongly advise she get the MRI. She is invested in scheduling. Sent message to Giles Baltazar. Denied current active SI/HI. The patient had the following concerns which I deferred to their referring provider: Meds      Time spent today:  25    Pursuant to the emergency declaration under the [facility], [address] [place] [address] authority and the [employer], this Virtual  Visit (audio/visual) was conducted, with patient's consent, to reduce the patient's risk of exposure to COVID-19 and provide continuity of care. Services were provided in this manner to substitute for in-person clinic visit.

## 2020-06-04 ENCOUNTER — TELEPHONE (OUTPATIENT)
Dept: NEUROLOGY | Age: 40
End: 2020-06-04

## 2020-06-04 NOTE — TELEPHONE ENCOUNTER
It looks like its already approved? It should matter what the code is.    She was having memory concerns too

## 2020-06-04 NOTE — TELEPHONE ENCOUNTER
----- Message from Tiny Nguyen sent at 6/4/2020  9:55 AM EDT -----  Regarding: Jose/Telephone  General Message/Vendor Calls    Caller's first and last name:Shaneka Burciaga      Reason for call: MRI code for referral is incorrect       Callback required yes/no and why:yes      Best contact number(s): [phone]      Details to clarify the request:Pt called requesting a call back in regards to pt referral for MRI has the wrong diagnosis code the patient stated currently the code is for memory loss it should be for migraines and pt insurance has denied the referral . Pt has a scheduled appt tomorrow .        Tiny Nguyen

## 2020-06-04 NOTE — TELEPHONE ENCOUNTER
Pt states because her primary care doctor got the referral for the pt to see you for migraines that you have to order her test using migraines as her dx. She wants to know if you would do this so she can still get the MRI? I did tell the pt to cancel her appt for tomorrow until we get everything straightened out.

## 2020-06-08 DIAGNOSIS — G43.919 INTRACTABLE MIGRAINE WITHOUT STATUS MIGRAINOSUS, UNSPECIFIED MIGRAINE TYPE: Primary | ICD-10-CM

## 2020-06-10 ENCOUNTER — HOSPITAL ENCOUNTER (OUTPATIENT)
Dept: MRI IMAGING | Age: 40
Discharge: HOME | End: 2020-06-10
Attending: NURSE PRACTITIONER
Payer: OTHER GOVERNMENT

## 2020-06-10 VITALS — BODY MASS INDEX: 34.86 KG/M2 | WEIGHT: 216 LBS

## 2020-06-10 DIAGNOSIS — G43.919 INTRACTABLE MIGRAINE WITHOUT STATUS MIGRAINOSUS, UNSPECIFIED MIGRAINE TYPE: ICD-10-CM

## 2020-06-10 PROCEDURE — A9575 INJ GADOTERATE MEGLUMI 0.1ML: HCPCS | Performed by: RADIOLOGY

## 2020-06-10 PROCEDURE — 70553 MRI BRAIN STEM W/O & W/DYE: CPT

## 2020-06-10 PROCEDURE — 74011250636 HC RX REV CODE- 250/636: Performed by: RADIOLOGY

## 2020-06-10 RX ORDER — GADOTERATE MEGLUMINE 376.9 MG/ML
19 INJECTION INTRAVENOUS
Status: COMPLETED | OUTPATIENT
Start: 2020-06-10 | End: 2020-06-10

## 2020-06-10 RX ADMIN — GADOTERATE MEGLUMINE 19 ML: 376.9 INJECTION INTRAVENOUS at 09:51

## 2020-09-10 ENCOUNTER — OFFICE VISIT (OUTPATIENT)
Dept: NEUROLOGY | Age: 40
End: 2020-09-10
Payer: OTHER GOVERNMENT

## 2020-09-10 VITALS
OXYGEN SATURATION: 98 % | WEIGHT: 216 LBS | TEMPERATURE: 97.3 F | SYSTOLIC BLOOD PRESSURE: 110 MMHG | DIASTOLIC BLOOD PRESSURE: 82 MMHG | HEART RATE: 90 BPM | BODY MASS INDEX: 34.86 KG/M2

## 2020-09-10 DIAGNOSIS — G43.919 INTRACTABLE MIGRAINE WITHOUT STATUS MIGRAINOSUS, UNSPECIFIED MIGRAINE TYPE: Primary | ICD-10-CM

## 2020-09-10 DIAGNOSIS — G31.84 MCI (MILD COGNITIVE IMPAIRMENT): ICD-10-CM

## 2020-09-10 DIAGNOSIS — F90.0 ATTENTION DEFICIT HYPERACTIVITY DISORDER (ADHD), PREDOMINANTLY INATTENTIVE TYPE: ICD-10-CM

## 2020-09-10 PROCEDURE — 99215 OFFICE O/P EST HI 40 MIN: CPT | Performed by: NURSE PRACTITIONER

## 2020-09-10 RX ORDER — GALCANEZUMAB 120 MG/ML
120 INJECTION, SOLUTION SUBCUTANEOUS
Qty: 1 SYRINGE | Refills: 5 | Status: SHIPPED | OUTPATIENT
Start: 2020-09-10 | End: 2020-11-19

## 2020-09-10 RX ORDER — GALCANEZUMAB 120 MG/ML
240 INJECTION, SOLUTION SUBCUTANEOUS
Qty: 2 ML | Refills: 0 | Status: SHIPPED | COMMUNITY
Start: 2020-09-10 | End: 2020-11-19

## 2020-09-10 RX ORDER — DEXTROAMPHETAMINE SACCHARATE, AMPHETAMINE ASPARTATE, DEXTROAMPHETAMINE SULFATE AND AMPHETAMINE SULFATE 5; 5; 5; 5 MG/1; MG/1; MG/1; MG/1
20 TABLET ORAL DAILY
Qty: 30 TAB | Refills: 0 | Status: SHIPPED | OUTPATIENT
Start: 2020-09-10 | End: 2020-09-11 | Stop reason: SDUPTHER

## 2020-09-10 NOTE — PROGRESS NOTES
Sharyn Fuchs is a 36 y.o. female who presents with the following  Chief Complaint   Patient presents with    Follow-up    Results     MRI       HPI     FU for MRI, neuropsych, migraines. Patient comes in for a follow up for worsening migraines. Mother gets botox and was a bad experience.      She is having chronic, 30 days of migraines a month.   Did Aimovig 2 samples with no results.        Migraines are located across the forehead usually and behind the eyes. She notices she has nausea, dizziness, light sensitivity, sound sensitivity and dizziness. Has had migraines for years now. The pain is pulsating. She has a family hx of migraines with her mother. Still debilitated due to her headaches.           Failed Pamelor, is also on other SSRIS right now and has failed. Failed AED.    Imitrex is not working for rescue either. Failed imitrex nasal spray also.   Can not take BP medications as she is hypotensive. Has been to ER a few times for IV infusions.   Never used Vyepti.        She does have memory fog and confusion with these migraines. And day to day. Feels slow.       No Known Allergies    Current Outpatient Medications   Medication Sig    galcanezumab-gnlm (Emgality Pen) 120 mg/mL injection 1 mL by SubCUTAneous route every thirty (30) days.  rimegepant (NURTEC) 75 mg disintegrating tablet Take 1 tablet at HA onset. Max 1 dose in 24 hours.  dextroamphetamine-amphetamine (ADDERALL) 20 mg tablet Take 1 Tab by mouth daily. Max Daily Amount: 20 mg.    galcanezumab-gnlm (Emgality Pen) 120 mg/mL injection 2 mL by SubCUTAneous route every thirty (30) days.  butalbital-acetaminophen-caff (FIORICET) -40 mg per capsule Take 1 Cap by mouth every six (6) hours as needed for Pain. No current facility-administered medications for this visit.         Social History     Tobacco Use   Smoking Status Never Smoker   Smokeless Tobacco Never Used       Past Medical History: Diagnosis Date    Anxiety     Depression     Headache     Pulmonary embolism (HCC)     PE    Tinnitus        Past Surgical History:   Procedure Laterality Date    ABDOMEN SURGERY PROC UNLISTED      HX CHOLECYSTECTOMY      HX ORTHOPAEDIC Left 2012    arch/bone removal with hardware    HX ORTHOPAEDIC  2016    hardware removal       Family History   Problem Relation Age of Onset    Diabetes Mother     Diabetes Maternal Aunt     Diabetes Paternal Aunt     Diabetes Maternal Grandmother     Diabetes Paternal Grandmother        Social History     Socioeconomic History    Marital status:      Spouse name: Not on file    Number of children: Not on file    Years of education: Not on file    Highest education level: Not on file   Tobacco Use    Smoking status: Never Smoker    Smokeless tobacco: Never Used   Substance and Sexual Activity    Alcohol use: Yes       Review of Systems   Eyes: Positive for blurred vision, double vision and photophobia. Respiratory: Negative for shortness of breath and wheezing. Gastrointestinal: Positive for nausea and vomiting. Neurological: Positive for dizziness and headaches. Negative for tingling, tremors and sensory change. Psychiatric/Behavioral: Positive for memory loss. Remainder of comprehensive review is negative. Physical Exam :    Visit Vitals  /82   Pulse 90   Temp 97.3 °F (36.3 °C)   Wt 98 kg (216 lb)   SpO2 98%   BMI 34.86 kg/m²           Results for orders placed or performed during the hospital encounter of 02/17/20   SAMPLES BEING HELD   Result Value Ref Range    SAMPLES BEING HELD 1PST,1SST,1RED,1LAV,1BLU     COMMENT        Add-on orders for these samples will be processed based on acceptable specimen integrity and analyte stability, which may vary by analyte.    CBC WITH AUTOMATED DIFF   Result Value Ref Range    WBC 10.5 3.6 - 11.0 K/uL    RBC 4.97 3.80 - 5.20 M/uL    HGB 14.4 11.5 - 16.0 g/dL    HCT 44.7 35.0 - 47.0 % MCV 89.9 80.0 - 99.0 FL    MCH 29.0 26.0 - 34.0 PG    MCHC 32.2 30.0 - 36.5 g/dL    RDW 12.1 11.5 - 14.5 %    PLATELET 638 484 - 328 K/uL    MPV 11.4 8.9 - 12.9 FL    NRBC 0.0 0  WBC    ABSOLUTE NRBC 0.00 0.00 - 0.01 K/uL    NEUTROPHILS 72 32 - 75 %    LYMPHOCYTES 20 12 - 49 %    MONOCYTES 6 5 - 13 %    EOSINOPHILS 2 0 - 7 %    BASOPHILS 0 0 - 1 %    IMMATURE GRANULOCYTES 0 0.0 - 0.5 %    ABS. NEUTROPHILS 7.5 1.8 - 8.0 K/UL    ABS. LYMPHOCYTES 2.1 0.8 - 3.5 K/UL    ABS. MONOCYTES 0.7 0.0 - 1.0 K/UL    ABS. EOSINOPHILS 0.2 0.0 - 0.4 K/UL    ABS. BASOPHILS 0.0 0.0 - 0.1 K/UL    ABS. IMM. GRANS. 0.0 0.00 - 0.04 K/UL    DF AUTOMATED     METABOLIC PANEL, COMPREHENSIVE   Result Value Ref Range    Sodium 139 136 - 145 mmol/L    Potassium 3.7 3.5 - 5.1 mmol/L    Chloride 106 97 - 108 mmol/L    CO2 26 21 - 32 mmol/L    Anion gap 7 5 - 15 mmol/L    Glucose 89 65 - 100 mg/dL    BUN 10 6 - 20 MG/DL    Creatinine 1.09 (H) 0.55 - 1.02 MG/DL    BUN/Creatinine ratio 9 (L) 12 - 20      GFR est AA >60 >60 ml/min/1.73m2    GFR est non-AA 56 (L) >60 ml/min/1.73m2    Calcium 8.8 8.5 - 10.1 MG/DL    Bilirubin, total 0.5 0.2 - 1.0 MG/DL    ALT (SGPT) 42 12 - 78 U/L    AST (SGOT) 26 15 - 37 U/L    Alk.  phosphatase 65 45 - 117 U/L    Protein, total 8.8 (H) 6.4 - 8.2 g/dL    Albumin 3.9 3.5 - 5.0 g/dL    Globulin 4.9 (H) 2.0 - 4.0 g/dL    A-G Ratio 0.8 (L) 1.1 - 2.2     URINALYSIS W/ RFLX MICROSCOPIC   Result Value Ref Range    Color YELLOW/STRAW      Appearance CLEAR CLEAR      Specific gravity 1.020 1.003 - 1.030      pH (UA) 5.5 5.0 - 8.0      Protein NEGATIVE  NEG mg/dL    Glucose NEGATIVE  NEG mg/dL    Ketone NEGATIVE  NEG mg/dL    Bilirubin NEGATIVE  NEG      Blood NEGATIVE  NEG      Urobilinogen 0.2 0.2 - 1.0 EU/dL    Nitrites NEGATIVE  NEG      Leukocyte Esterase NEGATIVE  NEG     HCG URINE, QL. - POC   Result Value Ref Range    Pregnancy test,urine (POC) NEGATIVE  NEG     EKG, 12 LEAD, INITIAL   Result Value Ref Range Ventricular Rate 71 BPM    Atrial Rate 71 BPM    P-R Interval 168 ms    QRS Duration 80 ms    Q-T Interval 380 ms    QTC Calculation (Bezet) 412 ms    Calculated P Axis 33 degrees    Calculated R Axis 57 degrees    Calculated T Axis 43 degrees    Diagnosis       Normal sinus rhythm  Normal ECG  When compared with ECG of 2018 18:54,  QRS axis shifted left  Criteria for Lateral infarct are no longer present  Inverted T waves have replaced nonspecific T wave abnormality in Anterior   leads  T wave inversion no longer evident in Lateral leads  Confirmed by Nellie Ann MD., Mira Tompkins ([id number]) on 2020 9:18:53 PM         Orders Placed This Encounter    REFERRAL TO SPEECH THERAPY     Referral Priority:   Routine     Referral Type:   PT/OT/ST     Referral Reason:   Specialty Services Required     Number of Visits Requested:   1    EEG AMB NEURO     Order Specific Question:   Reason for Exam:     Answer:   memory loss    galcanezumab-gnlm (Emgality Pen) 120 mg/mL injection     Si mL by SubCUTAneous route every thirty (30) days. Dispense:  1 Syringe     Refill:  5    rimegepant (NURTEC) 75 mg disintegrating tablet     Sig: Take 1 tablet at HA onset. Max 1 dose in 24 hours. Dispense:  8 Tab     Refill:  5    dextroamphetamine-amphetamine (ADDERALL) 20 mg tablet     Sig: Take 1 Tab by mouth daily. Max Daily Amount: 20 mg. Dispense:  30 Tab     Refill:  0    galcanezumab-gnlm (Emgality Pen) 120 mg/mL injection     Si mL by SubCUTAneous route every thirty (30) days. Dispense:  2 mL     Refill:  0       1. Intractable migraine without status migrainosus, unspecified migraine type    2. MCI (mild cognitive impairment)    3. Attention deficit hyperactivity disorder (ADHD), predominantly inattentive type          Discussed MRI and memory testing. Printed out both for her. try Emgality for prevention of migraine. Given sample loading dose then 1 injection every 30 days thereafter.    Discussed and demo in office     Try Nurtec ODT for PRN rescue of migraines. Failed multiple triptans and is taking OTC now due to side effects and it does help. Discussed memory concerns. We will get an EEG to look at brainwaves. Discussed MRI. Try Adderall to help with attention issues. FU after.            This note will not be viewable in [employer]

## 2020-09-11 DIAGNOSIS — F90.0 ATTENTION DEFICIT HYPERACTIVITY DISORDER (ADHD), PREDOMINANTLY INATTENTIVE TYPE: ICD-10-CM

## 2020-09-11 RX ORDER — DEXTROAMPHETAMINE SACCHARATE, AMPHETAMINE ASPARTATE, DEXTROAMPHETAMINE SULFATE AND AMPHETAMINE SULFATE 5; 5; 5; 5 MG/1; MG/1; MG/1; MG/1
20 TABLET ORAL DAILY
Qty: 30 TAB | Refills: 0 | Status: SHIPPED | OUTPATIENT
Start: 2020-09-11 | End: 2020-11-10 | Stop reason: SDUPTHER

## 2020-11-10 DIAGNOSIS — F90.0 ATTENTION DEFICIT HYPERACTIVITY DISORDER (ADHD), PREDOMINANTLY INATTENTIVE TYPE: ICD-10-CM

## 2020-11-10 RX ORDER — DEXTROAMPHETAMINE SACCHARATE, AMPHETAMINE ASPARTATE, DEXTROAMPHETAMINE SULFATE AND AMPHETAMINE SULFATE 5; 5; 5; 5 MG/1; MG/1; MG/1; MG/1
20 TABLET ORAL DAILY
Qty: 30 TAB | Refills: 0 | Status: SHIPPED | OUTPATIENT
Start: 2020-11-10 | End: 2020-11-19

## 2020-11-19 ENCOUNTER — VIRTUAL VISIT (OUTPATIENT)
Dept: NEUROLOGY | Age: 40
End: 2020-11-19
Payer: OTHER GOVERNMENT

## 2020-11-19 DIAGNOSIS — F90.0 ATTENTION DEFICIT HYPERACTIVITY DISORDER (ADHD), PREDOMINANTLY INATTENTIVE TYPE: Primary | ICD-10-CM

## 2020-11-19 DIAGNOSIS — G43.919 INTRACTABLE MIGRAINE WITHOUT STATUS MIGRAINOSUS, UNSPECIFIED MIGRAINE TYPE: ICD-10-CM

## 2020-11-19 PROCEDURE — 99214 OFFICE O/P EST MOD 30 MIN: CPT | Performed by: NURSE PRACTITIONER

## 2020-11-19 RX ORDER — DEXTROAMPHETAMINE SACCHARATE, AMPHETAMINE ASPARTATE, DEXTROAMPHETAMINE SULFATE AND AMPHETAMINE SULFATE 7.5; 7.5; 7.5; 7.5 MG/1; MG/1; MG/1; MG/1
30 TABLET ORAL DAILY
Qty: 30 TAB | Refills: 0 | Status: SHIPPED | OUTPATIENT
Start: 2020-11-19 | End: 2021-01-07 | Stop reason: SDUPTHER

## 2020-11-19 RX ORDER — GALCANEZUMAB 120 MG/ML
120 INJECTION, SOLUTION SUBCUTANEOUS
Qty: 1 SYRINGE | Refills: 5 | Status: SHIPPED | OUTPATIENT
Start: 2020-11-19 | End: 2022-01-13 | Stop reason: ALTCHOICE

## 2020-11-19 NOTE — PROGRESS NOTES
Ashlie Turcios is a 36 y.o. female who presents with the following  Chief Complaint   Patient presents with    Follow-up    Migraine       HPI      Patient comes in for a follow up for worsening migraines. Mother gets botox and was a bad experience. Did use Emgality and had positive changes X 1 dose. Did not get it approved. Did help a lot. Only had 2 migraines since then in September.      She was having chronic, 30 days of migraines a month.   Did Aimovig 2 samples with no results.       Migraines are located across the forehead usually and behind the eyes. She notices she has nausea, dizziness, light sensitivity, sound sensitivity and dizziness. Has had migraines for years now. The pain is pulsating. She has a family hx of migraines with her mother.   Still debilitated due to her headaches.        Failed Pamelor, is also on other SSRIS right now and has failed. Failed AED.    Imitrex is not working for rescue either. Failed imitrex nasal spray also.   Can not take BP medications as she is hypotensive. Has been to ER a few times for IV infusions.     20 mg Adderall has helped   Wants to increase. More focused, alert, day to day functioning better. No Known Allergies    Current Outpatient Medications   Medication Sig    galcanezumab-gnlm (Emgality Pen) 120 mg/mL injection 1 mL by SubCUTAneous route every thirty (30) days.  rimegepant (NURTEC) 75 mg disintegrating tablet Take 1 tablet at HA onset. Max 1 dose in 24 hours.  dextroamphetamine-amphetamine (ADDERALL) 30 mg tablet Take 1 Tab by mouth daily. Max Daily Amount: 1 Tab.  butalbital-acetaminophen-caff (FIORICET) -40 mg per capsule Take 1 Cap by mouth every six (6) hours as needed for Pain. No current facility-administered medications for this visit.         Social History     Tobacco Use   Smoking Status Never Smoker   Smokeless Tobacco Never Used       Past Medical History:   Diagnosis Date    Anxiety     Depression     Headache     Pulmonary embolism (HCC)     PE    Tinnitus        Past Surgical History:   Procedure Laterality Date    ABDOMEN SURGERY PROC UNLISTED      HX CHOLECYSTECTOMY      HX ORTHOPAEDIC Left 2012    arch/bone removal with hardware    HX ORTHOPAEDIC  2016    hardware removal       Family History   Problem Relation Age of Onset    Diabetes Mother     Diabetes Maternal Aunt     Diabetes Paternal Aunt     Diabetes Maternal Grandmother     Diabetes Paternal Grandmother        Social History     Socioeconomic History    Marital status:      Spouse name: Not on file    Number of children: Not on file    Years of education: Not on file    Highest education level: Not on file   Tobacco Use    Smoking status: Never Smoker    Smokeless tobacco: Never Used   Substance and Sexual Activity    Alcohol use: Yes       Review of Systems   Eyes: Positive for blurred vision and photophobia. Negative for double vision. Respiratory: Negative for shortness of breath and wheezing. Cardiovascular: Negative for chest pain and palpitations. Gastrointestinal: Positive for nausea. Negative for vomiting. Neurological: Positive for dizziness and headaches. Negative for tingling, tremors, sensory change, seizures and loss of consciousness. Remainder of comprehensive review is negative. Physical Exam :    There were no vitals taken for this visit. General: Well defined, nourished, and groomed individual in no acute distress.    Neck: Supple, nontender, no bruits, no pain with resistance to active range of motion.    Heart: Regular rate and rhythm, no murmurs, rub, or gallop. Normal S1S2.   Lungs: Clear to auscultation bilaterally with equal chest expansion, no cough, no wheeze  Musculoskeletal: Extremities revealed no edema and had full range of motion of joints.    Psych: Good mood and bright affect    NEUROLOGICAL EXAMINATION:    Mental Status: Alert and oriented to person, place, and time    Cranial Nerves:    II, III, IV, VI: Visual acuity grossly intact. Visual fields are normal.    Pupils are equal, round, and reactive to light and accommodation.    Extra-ocular movements are full and fluid. Fundoscopic exam was benign, no ptosis or nystagmus.    V-XII: Hearing is grossly intact. Facial features are symmetric, with normal sensation and strength. The palate rises symmetrically and the tongue protrudes midline. Sternocleidomastoids 5/5. Motor Examination: Normal tone, bulk, and strength, 5/5 muscle strength throughout. Coordination: Finger to nose was normal. No resting or intention tremor    Gait and Station: Steady while walking. Normal arm swing. No pronator drift. No muscle wasting or fasiculations noted. Reflexes: DTRs 2+ throughout. Results for orders placed or performed during the hospital encounter of 02/17/20   SAMPLES BEING HELD   Result Value Ref Range    SAMPLES BEING HELD 1PST,1SST,1RED,1LAV,1BLU     COMMENT        Add-on orders for these samples will be processed based on acceptable specimen integrity and analyte stability, which may vary by analyte. CBC WITH AUTOMATED DIFF   Result Value Ref Range    WBC 10.5 3.6 - 11.0 K/uL    RBC 4.97 3.80 - 5.20 M/uL    HGB 14.4 11.5 - 16.0 g/dL    HCT 44.7 35.0 - 47.0 %    MCV 89.9 80.0 - 99.0 FL    MCH 29.0 26.0 - 34.0 PG    MCHC 32.2 30.0 - 36.5 g/dL    RDW 12.1 11.5 - 14.5 %    PLATELET 088 228 - 326 K/uL    MPV 11.4 8.9 - 12.9 FL    NRBC 0.0 0  WBC    ABSOLUTE NRBC 0.00 0.00 - 0.01 K/uL    NEUTROPHILS 72 32 - 75 %    LYMPHOCYTES 20 12 - 49 %    MONOCYTES 6 5 - 13 %    EOSINOPHILS 2 0 - 7 %    BASOPHILS 0 0 - 1 %    IMMATURE GRANULOCYTES 0 0.0 - 0.5 %    ABS. NEUTROPHILS 7.5 1.8 - 8.0 K/UL    ABS. LYMPHOCYTES 2.1 0.8 - 3.5 K/UL    ABS. MONOCYTES 0.7 0.0 - 1.0 K/UL    ABS. EOSINOPHILS 0.2 0.0 - 0.4 K/UL    ABS. BASOPHILS 0.0 0.0 - 0.1 K/UL    ABS. IMM.  GRANS. 0.0 0.00 - 0.04 K/UL    DF AUTOMATED METABOLIC PANEL, COMPREHENSIVE   Result Value Ref Range    Sodium 139 136 - 145 mmol/L    Potassium 3.7 3.5 - 5.1 mmol/L    Chloride 106 97 - 108 mmol/L    CO2 26 21 - 32 mmol/L    Anion gap 7 5 - 15 mmol/L    Glucose 89 65 - 100 mg/dL    BUN 10 6 - 20 MG/DL    Creatinine 1.09 (H) 0.55 - 1.02 MG/DL    BUN/Creatinine ratio 9 (L) 12 - 20      GFR est AA >60 >60 ml/min/1.73m2    GFR est non-AA 56 (L) >60 ml/min/1.73m2    Calcium 8.8 8.5 - 10.1 MG/DL    Bilirubin, total 0.5 0.2 - 1.0 MG/DL    ALT (SGPT) 42 12 - 78 U/L    AST (SGOT) 26 15 - 37 U/L    Alk.  phosphatase 65 45 - 117 U/L    Protein, total 8.8 (H) 6.4 - 8.2 g/dL    Albumin 3.9 3.5 - 5.0 g/dL    Globulin 4.9 (H) 2.0 - 4.0 g/dL    A-G Ratio 0.8 (L) 1.1 - 2.2     URINALYSIS W/ RFLX MICROSCOPIC   Result Value Ref Range    Color YELLOW/STRAW      Appearance CLEAR CLEAR      Specific gravity 1.020 1.003 - 1.030      pH (UA) 5.5 5.0 - 8.0      Protein NEGATIVE  NEG mg/dL    Glucose NEGATIVE  NEG mg/dL    Ketone NEGATIVE  NEG mg/dL    Bilirubin NEGATIVE  NEG      Blood NEGATIVE  NEG      Urobilinogen 0.2 0.2 - 1.0 EU/dL    Nitrites NEGATIVE  NEG      Leukocyte Esterase NEGATIVE  NEG     HCG URINE, QL. - POC   Result Value Ref Range    Pregnancy test,urine (POC) NEGATIVE  NEG     EKG, 12 LEAD, INITIAL   Result Value Ref Range    Ventricular Rate 71 BPM    Atrial Rate 71 BPM    P-R Interval 168 ms    QRS Duration 80 ms    Q-T Interval 380 ms    QTC Calculation (Bezet) 412 ms    Calculated P Axis 33 degrees    Calculated R Axis 57 degrees    Calculated T Axis 43 degrees    Diagnosis       Normal sinus rhythm  Normal ECG  When compared with ECG of 01-FEB-2018 18:54,  QRS axis shifted left  Criteria for Lateral infarct are no longer present  Inverted T waves have replaced nonspecific T wave abnormality in Anterior   leads  T wave inversion no longer evident in Lateral leads  Confirmed by Chino Elias MD., Dejan Gonzalez ([id number]) on 2/17/2020 9:18:53 PM         Orders Placed This Encounter    galcanezumab-gnlm (Emgality Pen) 120 mg/mL injection     Si mL by SubCUTAneous route every thirty (30) days. Dispense:  1 Syringe     Refill:  5    rimegepant (NURTEC) 75 mg disintegrating tablet     Sig: Take 1 tablet at HA onset. Max 1 dose in 24 hours. Dispense:  8 Tab     Refill:  5    dextroamphetamine-amphetamine (ADDERALL) 30 mg tablet     Sig: Take 1 Tab by mouth daily. Max Daily Amount: 1 Tab. Dispense:  30 Tab     Refill:  0       1. Attention deficit hyperactivity disorder (ADHD), predominantly inattentive type    2. Intractable migraine without status migrainosus, unspecified migraine type          Keep Emgality for prevention. Will get a loading dose sample and to 1 a month every 30 days. Nurtec for PRN rescues of migraine. Keep Adderall but increase 30 mg to see how this works.            This note will not be viewable in [employer]

## 2021-01-07 DIAGNOSIS — F90.0 ATTENTION DEFICIT HYPERACTIVITY DISORDER (ADHD), PREDOMINANTLY INATTENTIVE TYPE: ICD-10-CM

## 2021-01-07 RX ORDER — DEXTROAMPHETAMINE SACCHARATE, AMPHETAMINE ASPARTATE, DEXTROAMPHETAMINE SULFATE AND AMPHETAMINE SULFATE 7.5; 7.5; 7.5; 7.5 MG/1; MG/1; MG/1; MG/1
30 TABLET ORAL DAILY
Qty: 30 TAB | Refills: 0 | Status: SHIPPED | OUTPATIENT
Start: 2021-01-07 | End: 2021-02-19

## 2021-02-19 ENCOUNTER — VIRTUAL VISIT (OUTPATIENT)
Dept: NEUROLOGY | Age: 41
End: 2021-02-19
Payer: OTHER GOVERNMENT

## 2021-02-19 DIAGNOSIS — F90.0 ATTENTION DEFICIT HYPERACTIVITY DISORDER (ADHD), PREDOMINANTLY INATTENTIVE TYPE: ICD-10-CM

## 2021-02-19 PROCEDURE — 99214 OFFICE O/P EST MOD 30 MIN: CPT | Performed by: NURSE PRACTITIONER

## 2021-02-19 RX ORDER — DEXTROAMPHETAMINE SACCHARATE, AMPHETAMINE ASPARTATE, DEXTROAMPHETAMINE SULFATE AND AMPHETAMINE SULFATE 5; 5; 5; 5 MG/1; MG/1; MG/1; MG/1
20 TABLET ORAL DAILY
Qty: 90 TAB | Refills: 0 | Status: SHIPPED | OUTPATIENT
Start: 2021-02-19 | End: 2022-01-10 | Stop reason: SDUPTHER

## 2021-02-19 NOTE — PROGRESS NOTES
Ariane Collado is a 36 y.o. female who was seen by synchronous (real-time) audio-video technology on 2/19/2021 for Follow-up and Migraine         Patient comes in for a follow up for migraines VIA virtual.     Did use Emgality and having positive changes. She is having about 3-5 a month lasting about 1 day each. This is better. She was having chronic, 30 days of migraines a month.   Did Aimovig 2 samples with no results. Mother had bad reactions to Botox. She tries to let things go and not take any medications. She is waking up with headaches sometimes. Tries Excedrin first and Nurtec if needed.       Migraines are located across the forehead usually and behind the eyes. She notices she has nausea, dizziness, light sensitivity, sound sensitivity and dizziness. Has had migraines for years now. The pain is pulsating. She has a family hx of migraines with her mother.   Still debilitated due to her headaches.        Failed Pamelor, is also on other SSRIS right now and has failed. Failed AED.   Can not take BP medications as she is hypotensive. Has been to ER a few times for IV infusions.      20 mg Adderall has helped. Upped to 30 mg but caused her to stay up too much. Split in half but has wanted to up back to 20  Helping her speech a lot actually and not as bad. More focused, alert, day to day functioning better.        Assessment & Plan:   Diagnoses and all orders for this visit:    1. Attention deficit hyperactivity disorder (ADHD), predominantly inattentive type  -     dextroamphetamine-amphetamine (ADDERALL) 20 mg tablet; Take 1 Tab by mouth daily. Max Daily Amount: 20 mg.    Keep Emgality for now. Consider Vyepti if needed. Keep Nurtec for PRN migraine   Keep track of HA, triggers. Increase Adderall back to 20 mg to help with ADHD, speech, etc.           Subjective:       Prior to Admission medications    Medication Sig Start Date End Date Taking?  Authorizing Provider dextroamphetamine-amphetamine (ADDERALL) 20 mg tablet Take 1 Tab by mouth daily. Max Daily Amount: 20 mg. 2/19/21  Yes Michelle Luna NP   galcanezumab-gnlm (Emgality Pen) 120 mg/mL injection 1 mL by SubCUTAneous route every thirty (30) days. 11/19/20  Yes Michelle Luna NP   rimegepant (NURTEC) 75 mg disintegrating tablet Take 1 tablet at HA onset. Max 1 dose in 24 hours. 11/19/20  Yes Michelle Luna NP   butalbital-acetaminophen-caff (FIORICET) -40 mg per capsule Take 1 Cap by mouth every six (6) hours as needed for Pain. 5/28/19  Yes Alexis Galindo MD   dextroamphetamine-amphetamine (ADDERALL) 30 mg tablet Take 1 Tab by mouth daily. Max Daily Amount: 1 Tab. 1/7/21 2/19/21  Filiberto Lentz NP     Patient Active Problem List   Diagnosis Code    Pulmonary emboli ([facility]) I26.99    SIRS (systemic inflammatory response syndrome) ([facility]) R65.10    Cellulitis L03.90    Obesity E66.9    Severe obesity ([facility]) E66.01     Patient Active Problem List    Diagnosis Date Noted    Severe obesity ([facility] [address] 06/27/2019    Pulmonary emboli ([facility] [address] 12/18/2017    SIRS (systemic inflammatory response syndrome) ([facility] [address] 12/18/2017    Cellulitis 12/18/2017    Obesity 12/18/2017     Current Outpatient Medications   Medication Sig Dispense Refill    dextroamphetamine-amphetamine (ADDERALL) 20 mg tablet Take 1 Tab by mouth daily. Max Daily Amount: 20 mg. 90 Tab 0    galcanezumab-gnlm (Emgality Pen) 120 mg/mL injection 1 mL by SubCUTAneous route every thirty (30) days. 1 Syringe 5    rimegepant (NURTEC) 75 mg disintegrating tablet Take 1 tablet at HA onset. Max 1 dose in 24 hours. 8 Tab 5    butalbital-acetaminophen-caff (FIORICET) -40 mg per capsule Take 1 Cap by mouth every six (6) hours as needed for Pain.  10 Cap 0     No Known Allergies  Past Medical History:   Diagnosis Date    Anxiety     Depression     Headache     Pulmonary embolism (HCC)     PE    Tinnitus      Past Surgical History: Procedure Laterality Date    ABDOMEN SURGERY PROC UNLISTED      HX CHOLECYSTECTOMY      HX ORTHOPAEDIC Left 2012    arch/bone removal with hardware    HX ORTHOPAEDIC  2016    hardware removal     Family History   Problem Relation Age of Onset    Diabetes Mother     Diabetes Maternal Aunt     Diabetes Paternal Aunt     Diabetes Maternal Grandmother     Diabetes Paternal Grandmother      Social History     Tobacco Use    Smoking status: Never Smoker    Smokeless tobacco: Never Used   Substance Use Topics    Alcohol use: Yes       Review of Systems   Eyes: Positive for blurred vision and photophobia. Negative for double vision. Respiratory: Negative for shortness of breath and wheezing. Cardiovascular: Negative for chest pain and palpitations. Gastrointestinal: Positive for nausea. Negative for vomiting. Neurological: Positive for dizziness and headaches. Negative for tingling, tremors, sensory change, seizures and loss of consciousness. Objective:   No flowsheet data found.      [INSTRUCTIONS:  \"[x]\" Indicates a positive item  \"[]\" Indicates a negative item  -- DELETE ALL ITEMS NOT EXAMINED]    Constitutional: [x] Appears well-developed and well-nourished [x] No apparent distress      [] Abnormal -     Mental status: [x] Alert and awake  [x] Oriented to person/place/time [x] Able to follow commands    [] Abnormal -     Eyes:   EOM    [x]  Normal    [] Abnormal -   Sclera  [x]  Normal    [] Abnormal -          Discharge [x]  None visible   [] Abnormal -     HENT: [x] Normocephalic, atraumatic  [] Abnormal -   [x] Mouth/Throat: Mucous membranes are moist    External Ears [x] Normal  [] Abnormal -    Neck: [x] No visualized mass [] Abnormal -     Pulmonary/Chest: [x] Respiratory effort normal   [x] No visualized signs of difficulty breathing or respiratory distress        [] Abnormal -      Musculoskeletal:   [x] Normal gait with no signs of ataxia         [x] Normal range of motion of neck [] Abnormal -     Neurological:        [x] No Facial Asymmetry (Cranial nerve 7 motor function) (limited exam due to video visit)          [x] No gaze palsy        [] Abnormal -          Skin:        [x] No significant exanthematous lesions or discoloration noted on facial skin         [] Abnormal -            Psychiatric:       [x] Normal Affect [] Abnormal -        [x] No Hallucinations    Other pertinent observable physical exam findings:-        We discussed the expected course, resolution and complications of the diagnosis(es) in detail. Medication risks, benefits, costs, interactions, and alternatives were discussed as indicated. I advised her to contact the office if her condition worsens, changes or fails to improve as anticipated. She expressed understanding with the diagnosis(es) and plan. Mary Lou Kay, who was evaluated through a patient-initiated, synchronous (real-time) audio-video encounter, and/or her healthcare decision maker, is aware that it is a billable service, with coverage as determined by her insurance carrier. She provided verbal consent to proceed: Yes, and patient identification was verified. It was conducted pursuant to the emergency declaration under the [address] [place] [address], [address] [place] [address] authority and the [employer] Act. A caregiver was present when appropriate. Ability to conduct physical exam was limited. I was in the office. The patient was at home.       Melody Roger NP

## 2022-01-10 DIAGNOSIS — F90.0 ATTENTION DEFICIT HYPERACTIVITY DISORDER (ADHD), PREDOMINANTLY INATTENTIVE TYPE: ICD-10-CM

## 2022-01-10 NOTE — TELEPHONE ENCOUNTER
Requested Prescriptions     Pending Prescriptions Disp Refills    dextroamphetamine-amphetamine (ADDERALL) 20 mg tablet 90 Tablet 0     Sig: Take 1 Tablet by mouth daily. Max Daily Amount: 20 mg. Patient has an upcoming appt scheduled for 1/13.

## 2022-01-12 RX ORDER — DEXTROAMPHETAMINE SACCHARATE, AMPHETAMINE ASPARTATE, DEXTROAMPHETAMINE SULFATE AND AMPHETAMINE SULFATE 5; 5; 5; 5 MG/1; MG/1; MG/1; MG/1
20 TABLET ORAL DAILY
Qty: 90 TABLET | Refills: 0 | Status: SHIPPED | OUTPATIENT
Start: 2022-01-12

## 2022-01-13 ENCOUNTER — OFFICE VISIT (OUTPATIENT)
Dept: NEUROLOGY | Age: 42
End: 2022-01-13
Payer: OTHER GOVERNMENT

## 2022-01-13 VITALS
HEART RATE: 85 BPM | DIASTOLIC BLOOD PRESSURE: 64 MMHG | SYSTOLIC BLOOD PRESSURE: 104 MMHG | OXYGEN SATURATION: 98 % | TEMPERATURE: 96.8 F

## 2022-01-13 DIAGNOSIS — F90.0 ATTENTION DEFICIT HYPERACTIVITY DISORDER (ADHD), PREDOMINANTLY INATTENTIVE TYPE: ICD-10-CM

## 2022-01-13 DIAGNOSIS — G43.909 MIGRAINE WITHOUT STATUS MIGRAINOSUS, NOT INTRACTABLE, UNSPECIFIED MIGRAINE TYPE: Primary | ICD-10-CM

## 2022-01-13 PROCEDURE — 99214 OFFICE O/P EST MOD 30 MIN: CPT | Performed by: NURSE PRACTITIONER

## 2022-01-13 NOTE — PROGRESS NOTES
Sandra Simental is a 39 y.o. female who presents with the following  Chief Complaint   Patient presents with    Migraine       HPI    Patient comes in for a follow up for migraines, ADHD      Last visit she was having chronic, 30 days of migraines a month.   She is currently having about 2 migraines a month which are not as painful as they have been   Also not lasting as long  She has changed her diet, exercising, drinking more water. Emgality did not help   Aimovig did not help   Not interested in Botox. Tries Excedrin and helps       Migraines are located across the forehead usually and behind the eyes. She notices she has nausea, dizziness, light sensitivity, sound sensitivity and dizziness. Has had migraines for years now. The pain is pulsating. She has a family hx of migraines with her mother.        Failed Pamelor and other SSRI  Failed AED.   Can not take BP medications as she is hypotensive.        20 mg Adderall   10 mg not enough, 30 too much. Helping her speech a lot actually and not as bad. More focused, alert, day to day functioning better.       No Known Allergies    Current Outpatient Medications   Medication Sig    dextroamphetamine-amphetamine (ADDERALL) 20 mg tablet Take 1 Tablet by mouth daily. Max Daily Amount: 20 mg. No current facility-administered medications for this visit.        Social History     Tobacco Use   Smoking Status Never Smoker   Smokeless Tobacco Never Used       Past Medical History:   Diagnosis Date    Anxiety     Depression     Headache     Pulmonary embolism (HCC)     PE    Tinnitus        Past Surgical History:   Procedure Laterality Date    ABDOMEN SURGERY PROC UNLISTED      HX CHOLECYSTECTOMY      HX ORTHOPAEDIC Left 2012    arch/bone removal with hardware    HX ORTHOPAEDIC  2016    hardware removal       Family History   Problem Relation Age of Onset    Diabetes Mother     Diabetes Maternal Aunt     Diabetes Paternal Aunt     Diabetes Maternal Grandmother     Diabetes Paternal Grandmother        Social History     Socioeconomic History    Marital status:    Tobacco Use    Smoking status: Never Smoker    Smokeless tobacco: Never Used   Substance and Sexual Activity    Alcohol use: Yes       Review of Systems   Eyes: Positive for blurred vision and photophobia. Negative for double vision. Respiratory: Negative for shortness of breath and wheezing. Gastrointestinal: Positive for nausea. Negative for vomiting. Neurological: Positive for dizziness and headaches. Negative for seizures and loss of consciousness. Remainder of comprehensive review is negative. Physical Exam :    Visit Vitals  /64   Pulse 85   Temp 96.8 °F (36 °C)   SpO2 98%       General: Well defined, nourished, and groomed individual in no acute distress.    Neck: Supple, nontender, no bruits, no pain with resistance to active range of motion.    Musculoskeletal: Extremities revealed no edema and had full range of motion of joints.    Psych: Good mood and bright affect    NEUROLOGICAL EXAMINATION:    Mental Status: Alert and oriented to person, place, and time    Cranial Nerves:    II, III, IV, VI: Visual acuity grossly intact. Visual fields are normal.    Pupils are equal, round, and reactive to light and accommodation.    Extra-ocular movements are full and fluid. Fundoscopic exam was benign, no ptosis or nystagmus.    V-XII: Hearing is grossly intact. Facial features are symmetric, with normal sensation and strength. The palate rises symmetrically and the tongue protrudes midline. Sternocleidomastoids 5/5. Motor Examination: Normal tone, bulk, and strength, 5/5 muscle strength throughout. Coordination: Finger to nose was normal. No resting or intention tremor    Gait and Station: Steady while walking. Normal arm swing. No pronator drift. No muscle wasting or fasiculations noted. Reflexes: DTRs 2+ throughout.         Results for orders placed or performed during the hospital encounter of 02/17/20   SAMPLES BEING HELD   Result Value Ref Range    SAMPLES BEING HELD 1PST,1SST,1RED,1LAV,1BLU     COMMENT        Add-on orders for these samples will be processed based on acceptable specimen integrity and analyte stability, which may vary by analyte. CBC WITH AUTOMATED DIFF   Result Value Ref Range    WBC 10.5 3.6 - 11.0 K/uL    RBC 4.97 3.80 - 5.20 M/uL    HGB 14.4 11.5 - 16.0 g/dL    HCT 44.7 35.0 - 47.0 %    MCV 89.9 80.0 - 99.0 FL    MCH 29.0 26.0 - 34.0 PG    MCHC 32.2 30.0 - 36.5 g/dL    RDW 12.1 11.5 - 14.5 %    PLATELET 716 067 - 701 K/uL    MPV 11.4 8.9 - 12.9 FL    NRBC 0.0 0  WBC    ABSOLUTE NRBC 0.00 0.00 - 0.01 K/uL    NEUTROPHILS 72 32 - 75 %    LYMPHOCYTES 20 12 - 49 %    MONOCYTES 6 5 - 13 %    EOSINOPHILS 2 0 - 7 %    BASOPHILS 0 0 - 1 %    IMMATURE GRANULOCYTES 0 0.0 - 0.5 %    ABS. NEUTROPHILS 7.5 1.8 - 8.0 K/UL    ABS. LYMPHOCYTES 2.1 0.8 - 3.5 K/UL    ABS. MONOCYTES 0.7 0.0 - 1.0 K/UL    ABS. EOSINOPHILS 0.2 0.0 - 0.4 K/UL    ABS. BASOPHILS 0.0 0.0 - 0.1 K/UL    ABS. IMM. GRANS. 0.0 0.00 - 0.04 K/UL    DF AUTOMATED     METABOLIC PANEL, COMPREHENSIVE   Result Value Ref Range    Sodium 139 136 - 145 mmol/L    Potassium 3.7 3.5 - 5.1 mmol/L    Chloride 106 97 - 108 mmol/L    CO2 26 21 - 32 mmol/L    Anion gap 7 5 - 15 mmol/L    Glucose 89 65 - 100 mg/dL    BUN 10 6 - 20 MG/DL    Creatinine 1.09 (H) 0.55 - 1.02 MG/DL    BUN/Creatinine ratio 9 (L) 12 - 20      GFR est AA >60 >60 ml/min/1.73m2    GFR est non-AA 56 (L) >60 ml/min/1.73m2    Calcium 8.8 8.5 - 10.1 MG/DL    Bilirubin, total 0.5 0.2 - 1.0 MG/DL    ALT (SGPT) 42 12 - 78 U/L    AST (SGOT) 26 15 - 37 U/L    Alk.  phosphatase 65 45 - 117 U/L    Protein, total 8.8 (H) 6.4 - 8.2 g/dL    Albumin 3.9 3.5 - 5.0 g/dL    Globulin 4.9 (H) 2.0 - 4.0 g/dL    A-G Ratio 0.8 (L) 1.1 - 2.2     URINALYSIS W/ RFLX MICROSCOPIC   Result Value Ref Range    Color YELLOW/STRAW      Appearance CLEAR CLEAR      Specific gravity 1.020 1.003 - 1.030      pH (UA) 5.5 5.0 - 8.0      Protein NEGATIVE  NEG mg/dL    Glucose NEGATIVE  NEG mg/dL    Ketone NEGATIVE  NEG mg/dL    Bilirubin NEGATIVE  NEG      Blood NEGATIVE  NEG      Urobilinogen 0.2 0.2 - 1.0 EU/dL    Nitrites NEGATIVE  NEG      Leukocyte Esterase NEGATIVE  NEG     HCG URINE, QL. - POC   Result Value Ref Range    Pregnancy test,urine (POC) NEGATIVE  NEG     EKG, 12 LEAD, INITIAL   Result Value Ref Range    Ventricular Rate 71 BPM    Atrial Rate 71 BPM    P-R Interval 168 ms    QRS Duration 80 ms    Q-T Interval 380 ms    QTC Calculation (Bezet) 412 ms    Calculated P Axis 33 degrees    Calculated R Axis 57 degrees    Calculated T Axis 43 degrees    Diagnosis       Normal sinus rhythm  Normal ECG  When compared with ECG of 01-FEB-2018 18:54,  QRS axis shifted left  Criteria for Lateral infarct are no longer present  Inverted T waves have replaced nonspecific T wave abnormality in Anterior   leads  T wave inversion no longer evident in Lateral leads  Confirmed by Juan Perera MD., Diane Colon ([id number]) on 2/17/2020 9:18:53 PM         No orders of the defined types were placed in this encounter. 1. Migraine without status migrainosus, not intractable, unspecified migraine type    2. Attention deficit hyperactivity disorder (ADHD), predominantly inattentive type      Keep track of migraines and headaches for treatment purposes  Try Nurtec for PRN rescue of HA     Also can try Ubrelvy for PRN rescue and see what works best  Contact us back if so     Keep Adderall at 20 mg daily for attention.                This note will not be viewable in [facility]

## 2022-01-13 NOTE — PATIENT INSTRUCTIONS
Try Nurtec at the onset of a headache   1 tablet at the onset. Max 1 tablet in 24 hour s      Can try Ubrelvy on a headache as needed also but not together    1 tablet at headache onset. Can repeat in 2 hours as needed.  2 tablets in 24 hours     Let us know how these work and can go from there

## 2022-03-18 PROBLEM — R65.10 SIRS (SYSTEMIC INFLAMMATORY RESPONSE SYNDROME) (HCC): Status: ACTIVE | Noted: 2017-12-18

## 2022-03-18 PROBLEM — E66.01 SEVERE OBESITY (HCC): Status: ACTIVE | Noted: 2019-06-27

## 2022-03-18 PROBLEM — E66.9 OBESITY: Status: ACTIVE | Noted: 2017-12-18

## 2022-03-19 PROBLEM — L03.90 CELLULITIS: Status: ACTIVE | Noted: 2017-12-18

## 2022-03-19 PROBLEM — I26.99 PULMONARY EMBOLI (HCC): Status: ACTIVE | Noted: 2017-12-18

## 2023-02-13 ENCOUNTER — OFFICE VISIT (OUTPATIENT)
Dept: NEUROLOGY | Age: 43
End: 2023-02-13
Payer: OTHER GOVERNMENT

## 2023-02-13 VITALS
BODY MASS INDEX: 33.8 KG/M2 | DIASTOLIC BLOOD PRESSURE: 60 MMHG | OXYGEN SATURATION: 100 % | HEIGHT: 68 IN | SYSTOLIC BLOOD PRESSURE: 110 MMHG | HEART RATE: 93 BPM | WEIGHT: 223 LBS

## 2023-02-13 DIAGNOSIS — G43.909 MIGRAINE WITHOUT STATUS MIGRAINOSUS, NOT INTRACTABLE, UNSPECIFIED MIGRAINE TYPE: Primary | ICD-10-CM

## 2023-02-13 DIAGNOSIS — R20.2 PARESTHESIA: ICD-10-CM

## 2023-02-13 DIAGNOSIS — F90.0 ATTENTION DEFICIT HYPERACTIVITY DISORDER (ADHD), PREDOMINANTLY INATTENTIVE TYPE: ICD-10-CM

## 2023-02-13 DIAGNOSIS — G24.5 BLEPHAROSPASM: ICD-10-CM

## 2023-02-13 PROCEDURE — 99215 OFFICE O/P EST HI 40 MIN: CPT | Performed by: NURSE PRACTITIONER

## 2023-02-13 RX ORDER — DEXTROAMPHETAMINE SACCHARATE, AMPHETAMINE ASPARTATE, DEXTROAMPHETAMINE SULFATE AND AMPHETAMINE SULFATE 5; 5; 5; 5 MG/1; MG/1; MG/1; MG/1
20 TABLET ORAL DAILY
Qty: 90 TABLET | Refills: 0 | Status: SHIPPED | OUTPATIENT
Start: 2023-02-13

## 2023-02-14 NOTE — PROGRESS NOTES
Parmjit Harrell is a 43 y.o. female who presents with the following  Chief Complaint   Patient presents with    Follow-up     C/o left eye twitching and nerve pain through the body       HPI    Patient comes back in for nerve pain throughout the body in the left eye blepharospasm  She states she has had the symptoms for quite some time now  She states that since January the end of January she has not had the blepharospasm anymore but she travel to the [facility] and may be it was stress related? She does have right arm paresthesia tingling and pins-and-needles  She has left leg paresthesia pins-and-needles these come and go  No rhyme or reason  Nothing helps nothing makes them worse  Arm will get very weak and painful and she is not able to use it  just very symptomatic and causes her to have to stop doing what she is doing  No recent falls  No heavy lifting  She has had some blood work recently  No neurological history for her family  She has not had any recent testing in regard to nerve conduction or MRI to look at [address]    The blepharospasm is on the left eye only  It will continue to twitch out of the ordinary  This is never happened before  Nothing brings it on and nothing makes it better or worse        No Known Allergies    Current Outpatient Medications   Medication Sig    dextroamphetamine-amphetamine (ADDERALL) 20 mg tablet Take 1 Tablet by mouth daily. Max Daily Amount: 20 mg. No current facility-administered medications for this visit.        Social History     Tobacco Use   Smoking Status Never   Smokeless Tobacco Never       Past Medical History:   Diagnosis Date    Anxiety     Depression     Headache     Pulmonary embolism ([address]     PE    Tinnitus        Past Surgical History:   Procedure Laterality Date    HX CHOLECYSTECTOMY      HX ORTHOPAEDIC Left 2012    arch/bone removal with hardware    HX ORTHOPAEDIC  2016    hardware removal    OK UNLISTED PROCEDURE ABDOMEN PERITONEUM & OMENTUM Family History   Problem Relation Age of Onset    Diabetes Mother     Diabetes Maternal Aunt     Diabetes Paternal Aunt     Diabetes Maternal Grandmother     Diabetes Paternal Grandmother        Social History     Socioeconomic History    Marital status:    Tobacco Use    Smoking status: Never    Smokeless tobacco: Never   Substance and Sexual Activity    Alcohol use: Yes       Review of Systems   Eyes:  Negative for blurred vision, double vision and photophobia. Neurological:  Positive for tingling, sensory change, weakness and headaches. Negative for dizziness, seizures and loss of consciousness. Remainder of comprehensive review is negative. Physical Exam :    Visit Vitals  /60   Pulse 93   Ht 5' 8\" (1.727 m)   Wt 101.2 kg (223 lb)   SpO2 100%   BMI 33.91 kg/m²       General: Well defined, nourished, and groomed individual in no acute distress. Musculoskeletal: Extremities revealed no edema and had full range of motion of joints. Psych: Good mood and bright affect    NEUROLOGICAL EXAMINATION:    Mental Status: Alert and oriented to person, place, and time    Cranial Nerves:    II, III, IV, VI: Visual acuity grossly intact. Visual fields are normal.    Pupils are equal, round, and reactive to light and accommodation. Extra-ocular movements are full and fluid. Fundoscopic exam was benign, no ptosis or nystagmus. V-XII: Hearing is grossly intact. Facial features are symmetric, with normal sensation and strength. The palate rises symmetrically and the tongue protrudes midline. Sternocleidomastoids 5/5. Motor Examination: Normal tone, bulk, and strength, 5/5 muscle strength throughout. Coordination: Finger to nose was normal. No resting or intention tremor    Gait and Station: Steady while walking. Normal arm swing. No pronator drift. No muscle wasting or fasiculations noted. Reflexes: DTRs 2+ throughout.             Results for orders placed or performed during the hospital encounter of 02/17/20   SAMPLES BEING HELD   Result Value Ref Range    SAMPLES BEING HELD 1PST,1SST,1RED,1LAV,1BLU     COMMENT        Add-on orders for these samples will be processed based on acceptable specimen integrity and analyte stability, which may vary by analyte. CBC WITH AUTOMATED DIFF   Result Value Ref Range    WBC 10.5 3.6 - 11.0 K/uL    RBC 4.97 3.80 - 5.20 M/uL    HGB 14.4 11.5 - 16.0 g/dL    HCT 44.7 35.0 - 47.0 %    MCV 89.9 80.0 - 99.0 FL    MCH 29.0 26.0 - 34.0 PG    MCHC 32.2 30.0 - 36.5 g/dL    RDW 12.1 11.5 - 14.5 %    PLATELET 044 214 - 281 K/uL    MPV 11.4 8.9 - 12.9 FL    NRBC 0.0 0  WBC    ABSOLUTE NRBC 0.00 0.00 - 0.01 K/uL    NEUTROPHILS 72 32 - 75 %    LYMPHOCYTES 20 12 - 49 %    MONOCYTES 6 5 - 13 %    EOSINOPHILS 2 0 - 7 %    BASOPHILS 0 0 - 1 %    IMMATURE GRANULOCYTES 0 0.0 - 0.5 %    ABS. NEUTROPHILS 7.5 1.8 - 8.0 K/UL    ABS. LYMPHOCYTES 2.1 0.8 - 3.5 K/UL    ABS. MONOCYTES 0.7 0.0 - 1.0 K/UL    ABS. EOSINOPHILS 0.2 0.0 - 0.4 K/UL    ABS. BASOPHILS 0.0 0.0 - 0.1 K/UL    ABS. IMM. GRANS. 0.0 0.00 - 0.04 K/UL    DF AUTOMATED     METABOLIC PANEL, COMPREHENSIVE   Result Value Ref Range    Sodium 139 136 - 145 mmol/L    Potassium 3.7 3.5 - 5.1 mmol/L    Chloride 106 97 - 108 mmol/L    CO2 26 21 - 32 mmol/L    Anion gap 7 5 - 15 mmol/L    Glucose 89 65 - 100 mg/dL    BUN 10 6 - 20 MG/DL    Creatinine 1.09 (H) 0.55 - 1.02 MG/DL    BUN/Creatinine ratio 9 (L) 12 - 20      GFR est AA >60 >60 ml/min/1.73m2    GFR est non-AA 56 (L) >60 ml/min/1.73m2    Calcium 8.8 8.5 - 10.1 MG/DL    Bilirubin, total 0.5 0.2 - 1.0 MG/DL    ALT (SGPT) 42 12 - 78 U/L    AST (SGOT) 26 15 - 37 U/L    Alk.  phosphatase 65 45 - 117 U/L    Protein, total 8.8 (H) 6.4 - 8.2 g/dL    Albumin 3.9 3.5 - 5.0 g/dL    Globulin 4.9 (H) 2.0 - 4.0 g/dL    A-G Ratio 0.8 (L) 1.1 - 2.2     URINALYSIS W/ RFLX MICROSCOPIC   Result Value Ref Range    Color YELLOW/STRAW      Appearance CLEAR CLEAR      Specific gravity 1.020 1.003 - 1.030      pH (UA) 5.5 5.0 - 8.0      Protein NEGATIVE NEG mg/dL    Glucose NEGATIVE NEG mg/dL    Ketone NEGATIVE NEG mg/dL    Bilirubin NEGATIVE NEG      Blood NEGATIVE NEG      Urobilinogen 0.2 0.2 - 1.0 EU/dL    Nitrites NEGATIVE NEG      Leukocyte Esterase NEGATIVE NEG     HCG URINE, QL. - POC   Result Value Ref Range    Pregnancy test,urine (POC) NEGATIVE NEG     EKG, 12 LEAD, INITIAL   Result Value Ref Range    Ventricular Rate 71 BPM    Atrial Rate 71 BPM    P-R Interval 168 ms    QRS Duration 80 ms    Q-T Interval 380 ms    QTC Calculation (Bezet) 412 ms    Calculated P Axis 33 degrees    Calculated R Axis 57 degrees    Calculated T Axis 43 degrees    Diagnosis       Normal sinus rhythm  Normal ECG  When compared with ECG of 01-FEB-2018 18:54,  QRS axis shifted left  Criteria for Lateral infarct are no longer present  Inverted T waves have replaced nonspecific T wave abnormality in Anterior   leads  T wave inversion no longer evident in Lateral leads  Confirmed by Priscilla Santoyo MD., Jalyn ([id number]) on 2/17/2020 9:18:53 PM         Orders Placed This Encounter    MRI BRAIN W WO CONT     Standing Status:   Future     Standing Expiration Date:   3/13/2024     Order Specific Question:   Is Patient Pregnant? Answer:   No     Order Specific Question:   STAT Creatinine as indicated     Answer:   No    EMG LIMITED     Standing Status:   Future     Standing Expiration Date:   8/13/2023     Order Specific Question:   Reason for Exam:     Answer:   left leg, right arm    dextroamphetamine-amphetamine (ADDERALL) 20 mg tablet     Sig: Take 1 Tablet by mouth daily. Max Daily Amount: 20 mg. Dispense:  90 Tablet     Refill:  0       1. Migraine without status migrainosus, not intractable, unspecified migraine type    2. Attention deficit hyperactivity disorder (ADHD), predominantly inattentive type    3. Paresthesia    4.  Blepharospasm        Discussed symptoms in full  We will get an EMG of the left leg and the right arm as these are what are symptomatic for her  We will get an MRI of the brain to rule out specifically demyelinating disease as cause along with evaluation of the blepharospasm closer  Continue Adderall as 20 mg as seen above  We will follow-up after the testing to reevaluate treatment            This note [facility] be viewable in [facility]

## 2023-03-02 ENCOUNTER — OFFICE VISIT (OUTPATIENT)
Dept: NEUROLOGY | Age: 43
End: 2023-03-02

## 2023-03-02 ENCOUNTER — HOSPITAL ENCOUNTER (OUTPATIENT)
Dept: MRI IMAGING | Age: 43
Discharge: HOME | End: 2023-03-02
Attending: NURSE PRACTITIONER
Payer: OTHER GOVERNMENT

## 2023-03-02 VITALS — BODY MASS INDEX: 32.84 KG/M2 | WEIGHT: 216 LBS

## 2023-03-02 DIAGNOSIS — R20.2 PARESTHESIA: Primary | ICD-10-CM

## 2023-03-02 DIAGNOSIS — G24.5 BLEPHAROSPASM: ICD-10-CM

## 2023-03-02 DIAGNOSIS — R20.2 PARESTHESIA: ICD-10-CM

## 2023-03-02 PROCEDURE — 74011250636 HC RX REV CODE- 250/636: Performed by: RADIOLOGY

## 2023-03-02 PROCEDURE — 70553 MRI BRAIN STEM W/O & W/DYE: CPT

## 2023-03-02 PROCEDURE — A9576 INJ PROHANCE MULTIPACK: HCPCS | Performed by: RADIOLOGY

## 2023-03-02 RX ADMIN — GADOTERIDOL 19 ML: 279.3 INJECTION, SOLUTION INTRAVENOUS at 19:06

## 2023-03-02 NOTE — PROCEDURES
EMG/ NCS Report  [facility]  - [facility]  [facility]  [facility], [address] [place], [address]   Ph: [phone]   FAX: [phone]/ [phone]  Test Date:  2019      Test Date:  3/2/2023    Patient: Carolina Rosas : 1980 Physician: Sabrina Boone MD   Sex: Female Height: ' \" Ref PhysDarol Court   ID#: [id number] Weight:  lbs. Technician: Elizabeth Alcaraz     Patient History / Exam:  Patient comes in with L lower extremity and right upper extremity numbness  (+) history of L foot multiple surgery with residual numbness and weakness of the L foot. (-) lower back pain. Patient is coming for neuropathy evaluation. EMG & NCV Findings:  Evaluation of the left Fibular motor nerve showed normal distal onset latency (5.6 ms), reduced amplitude (1.5 mV), normal conduction velocity (B Fib-Ankle, 44 m/s), and normal conduction velocity (Poplt-B Fib, 43 m/s). The right median motor nerve showed normal distal onset latency (3.4 ms), normal amplitude (12.6 mV), and normal conduction velocity (Elbow-Wrist, 53 m/s). The left tibial motor nerve showed normal distal onset latency (4.1 ms), normal amplitude (5.4 mV), and normal conduction velocity (Knee-Ankle, 46 m/s). The right ulnar motor nerve showed normal distal onset latency (3.0 ms), normal amplitude (10.9 mV), normal conduction velocity (B Elbow-Wrist, 58 m/s), and normal conduction velocity (A Elbow-B Elbow, 59 m/s). The right median sensory, the right radial sensory, the left Sup Fibular sensory, the left sural sensory, and the right ulnar sensory nerves showed normal distal peak latency (R3.1, R1.8, L2.5, L3.8, R3.1 ms) and normal amplitude (R46.4, R73.9, L7.3, L5.7, R45.5 µV).   The right median/ulnar (palm) comparison nerve showed normal distal onset latency (Median Palm, 1.3 ms), normal distal peak latency (Median Palm, 1.8 ms), normal amplitude (Median Palm, 95.2 µV), normal distal onset latency (Ulnar Palm, 1.7 ms), normal distal peak latency (Ulnar Palm, 2.0 ms), and normal amplitude (Ulnar Palm, 15.7 µV). All F Wave latencies were within normal limits. Needle evaluation of the left extensor digitorum brevis muscle showed diminished recruitment, Incr Duration, increased motor unit amplitude, and slightly increased polyphasic potentials. All remaining muscles (as indicated in the following table) showed no evidence of electrical instability. Impression:  ABNORMAL    Extensive electrodiagnostic examination of the right upper and left lower extremities shows the following:    Reduced left fibular motor amplitude response recording extensor digitorum brevis muscle. Chronic, without active, motor axon loss changes in left extensor digitorum brevis muscle. These are more likely residual findings related to history of multiple left foot surgeries. 2.  Electrical findings of the right upper extremity, including palmar study, are within normal limits    No evidence of a generalized polyneuropathy.          Sara Carbajal MD  Diplomate, American Board of Psychiatry and Neurology  Diplomate, Neuromuscular Medicine  Diplomate, American Board of Electrodiagnostic Medicine  Director, [address] [place] [address] Accredited Laboratory with Exemplary Status            Nerve Conduction Studies  Anti Sensory Summary Table     Stim Site NR Peak (ms) Norm Peak (ms) P-T Amp (µV) Norm P-T Amp Site1 Site2 Dist (cm)   Right Median Anti Sensory (2nd Digit)  28.6 °C   Wrist    3.1 <4 46.4 >13 Wrist 2nd Digit 14.0   Right Radial Anti Sensory (Base 1st Digit)  29.7 °C   Wrist    1.8 <2.8 73.9 >11 Wrist Base 1st Digit 10.0   Left Sup Fibular Anti Sensory (Lat ankle)  30.1 °C   Lower leg    2.5 <4.5 7.3 >5 Lower leg Lat ankle 10.0   Left Sural Anti Sensory (Lat Mall)  29.6 °C   Calf    3.8 <4.5 5.7 >4.0 Calf Lat Mall 14.0   Right Ulnar Anti Sensory (5th Digit)  29.3 °C   Wrist    3.1 <4.0 45.5 >9 Wrist 5th Digit 14.0 Motor Summary Table     Stim Site NR Onset (ms) Norm Onset (ms) O-P Amp (mV) Norm O-P Amp Amp (Prev) (%) Site1 Site2 Dist (cm) David (m/s) Norm David (m/s)   Left Fibular Motor (Ext Dig Brev)  22.8 °C   Ankle    5.6 <6.5 1.5 >2.6 100.0 Ankle Ext Dig Brev 8.0     B Fib    13.7  1.5  100.0 B Fib Ankle 36.0 44 >38   Poplt    16.0  1.3  86.7 Poplt B Fib 10.0 43 >42   Right Median Motor (Abd Poll Brev)  30.2 °C   Wrist    3.4 <4.5 12.6 >4.1 100.0 Wrist Abd Poll Brev 8.0     Elbow    7.7  12.0  95.2 Elbow Wrist 23.0 53 >49   Left Tibial Motor (Abd Deutsch Brev)  30.2 °C   Ankle    4.1 <6.1 5.4 >5.3 100.0 Ankle Abd Deutsch Brev 8.0     Knee    12.8  3.7  68.5 Knee Ankle 40.0 46 >39   Right Ulnar Motor (Abd Dig Minimi)  30 °C   Wrist    3.0 <3.1 10.9 >7.0 100.0 Wrist Abd Dig Minimi 8.0     B Elbow    6.9  10.0  91.7 B Elbow Wrist 22.5 58 >50   A Elbow    8.6  10.1  101.0 A Elbow B Elbow 10.0 59 >50     Comparison Summary Table     Stim Site NR Peak (ms) P-T Amp (µV) Site1 Site2 Dist (cm) Delta-0 (ms)   Right Median/Ulnar Palm Comparison (Wrist)  30.4 °C   Median Palm    1.8 124.4 Median Palm Ulnar Palm 8.0 0.4   Ulnar Palm    2.0 15.1         F Wave Studies     NR F-Lat (ms) Lat Norm (ms) L-R F-Lat (ms) L-R Lat Norm   Left Tibial (Mrkrs) (Abd Hallucis)  30 °C      53.92 <56  <5.7   Right Ulnar (Mrkrs) (Abd Dig Min)  30.2 °C      29.82 <32  <2.5     H Reflex Studies     NR H-Lat (ms) L-R H-Lat (ms) L-R Lat Norm   Left Tibial (Gastroc)  30 °C      31.50  <2.0     EMG     Side Muscle Nerve Root Ins Act Fibs Psw Recrt Duration Amp Poly Comment   Left Ext Dig Brev Dp Br Peron L5, S1 Nml Nml Nml Reduced Incr Incr 1+    Right 1stDorInt Ulnar C8-T1 Nml Nml Nml Nml Nml Nml Nml    Right ExtIndicis Radial (Post Int) C7-8 Nml Nml Nml Nml Nml Nml Nml    Right Abd Poll Brev Median C8-T1 Nml Nml Nml Nml Nml Nml Nml    Right Biceps Musculocut C5-6 Nml Nml Nml Nml Nml Nml Nml    Right Triceps Radial C6-7-8 Nml Nml Nml Nml Nml Nml Nml    Right Deltoid Axillary C5-6 Nml Nml Nml Nml Nml Nml Nml    Right Lower Cerv Parasp Rami C7,T1 Nml Nml Nml Nml Nml Nml Nml                Nerve Conduction Studies  Anti Sensory Left/Right Comparison     Stim Site L Lat (ms) R Lat (ms) L-R Lat (ms) L Amp (µV) R Amp (µV) L-R Amp (%) Site1 Site2 L David (m/s) R David (m/s) L-R David (m/s)   Median Anti Sensory (2nd Digit)  28.6 °C   Wrist  2.2   46.4  Wrist 2nd Digit  64    Radial Anti Sensory (Base 1st Digit)  29.7 °C   Wrist  1.3   73.9  Wrist Base 1st Digit  77    Sup Fibular Anti Sensory (Lat ankle)  30.1 °C   Lower leg 2.0   7.3   Lower leg Lat ankle 50     Sural Anti Sensory (Lat Mall)  29.6 °C   Calf 3.0   5.7   Calf Lat Mall 47     Ulnar Anti Sensory (5th Digit)  29.3 °C   Wrist  2.5   45.5  Wrist 5th Digit  56      Motor Left/Right Comparison     Stim Site L Lat (ms) R Lat (ms) L-R Lat (ms) L Amp (mV) R Amp (mV) L-R Amp (%) Site1 Site2 L David (m/s) R David (m/s) L-R David (m/s)   Fibular Motor (Ext Dig Brev)  22.8 °C   Ankle 5.6   1.5   Ankle Ext Dig Brev      B Fib 13.7   1.5   B Fib Ankle 44     Poplt 16.0   1.3   Poplt B Fib 43     Median Motor (Abd Poll Brev)  30.2 °C   Wrist  3.4   12.6  Wrist Abd Poll Brev      Elbow  7.7   12.0  Elbow Wrist  53    Tibial Motor (Abd Deutsch Brev)  30.2 °C   Ankle 4.1   5.4   Ankle Abd Deutsch Brev      Knee 12.8   3.7   Knee Ankle 46     Ulnar Motor (Abd Dig Minimi)  30 °C   Wrist  3.0   10.9  Wrist Abd Dig Minimi      B Elbow  6.9   10.0  B Elbow Wrist  58    A Elbow  8.6   10.1  A Elbow B Elbow  59      Comparison Left/Right Comparison     Stim Site L Lat (ms) R Lat (ms) L-R Lat (ms) L Amp (µV) R Amp (µV) L-R Amp (%)   Median/Ulnar Palm Comparison (Wrist)  30.4 °C   Median Palm  1.3   95.2    Ulnar Palm  1.7   15.7          Waveforms: